# Patient Record
Sex: FEMALE | Race: WHITE | NOT HISPANIC OR LATINO | Employment: OTHER | URBAN - NONMETROPOLITAN AREA
[De-identification: names, ages, dates, MRNs, and addresses within clinical notes are randomized per-mention and may not be internally consistent; named-entity substitution may affect disease eponyms.]

---

## 2020-09-18 DIAGNOSIS — Z12.31 ENCOUNTER FOR SCREENING MAMMOGRAM FOR BREAST CANCER: Primary | ICD-10-CM

## 2020-09-18 DIAGNOSIS — Z13.820 ENCOUNTER FOR SCREENING FOR OSTEOPOROSIS: Primary | ICD-10-CM

## 2020-11-09 ENCOUNTER — HOSPITAL ENCOUNTER (OUTPATIENT)
Dept: RADIOLOGY | Facility: HOSPITAL | Age: 80
Discharge: HOME | End: 2020-11-09
Attending: INTERNAL MEDICINE
Payer: MEDICARE

## 2020-11-09 VITALS — BODY MASS INDEX: 26.5 KG/M2 | WEIGHT: 144 LBS | HEIGHT: 62 IN

## 2020-11-09 DIAGNOSIS — Z12.31 ENCOUNTER FOR SCREENING MAMMOGRAM FOR BREAST CANCER: ICD-10-CM

## 2020-11-09 DIAGNOSIS — Z13.820 ENCOUNTER FOR SCREENING FOR OSTEOPOROSIS: ICD-10-CM

## 2020-11-09 PROCEDURE — 77080 DXA BONE DENSITY AXIAL: CPT | Mod: TC

## 2020-11-09 PROCEDURE — 77067 SCR MAMMO BI INCL CAD: CPT | Mod: TC

## 2021-01-10 ENCOUNTER — IMMUNIZATION (OUTPATIENT)
Dept: OBSTETRICS AND GYNECOLOGY | Facility: CLINIC | Age: 81
End: 2021-01-10
Payer: MEDICARE

## 2021-01-10 DIAGNOSIS — Z23 NEED FOR VACCINATION: ICD-10-CM

## 2021-01-10 PROCEDURE — 91300 COVID-19, MRNA, LNP-S, PF, 30 MCG/0.3 ML DOSE VACCINE: CPT | Mod: PBBFAC

## 2021-01-31 ENCOUNTER — IMMUNIZATION (OUTPATIENT)
Dept: OBSTETRICS AND GYNECOLOGY | Facility: CLINIC | Age: 81
End: 2021-01-31
Payer: MEDICARE

## 2021-01-31 DIAGNOSIS — Z23 NEED FOR VACCINATION: Primary | ICD-10-CM

## 2021-01-31 PROCEDURE — 0002A COVID-19, MRNA, LNP-S, PF, 30 MCG/0.3 ML DOSE VACCINE: CPT | Mod: PBBFAC

## 2021-01-31 PROCEDURE — 91300 COVID-19, MRNA, LNP-S, PF, 30 MCG/0.3 ML DOSE VACCINE: CPT | Mod: PBBFAC

## 2021-12-23 DIAGNOSIS — Z12.31 ENCOUNTER FOR SCREENING MAMMOGRAM FOR MALIGNANT NEOPLASM OF BREAST: Primary | ICD-10-CM

## 2021-12-27 ENCOUNTER — HOSPITAL ENCOUNTER (OUTPATIENT)
Dept: RADIOLOGY | Facility: HOSPITAL | Age: 81
Discharge: HOME | End: 2021-12-27
Attending: INTERNAL MEDICINE
Payer: MEDICARE

## 2021-12-27 VITALS — HEIGHT: 62 IN | WEIGHT: 144 LBS | BODY MASS INDEX: 26.5 KG/M2

## 2021-12-27 DIAGNOSIS — Z12.31 ENCOUNTER FOR SCREENING MAMMOGRAM FOR MALIGNANT NEOPLASM OF BREAST: ICD-10-CM

## 2021-12-27 PROCEDURE — 77067 SCR MAMMO BI INCL CAD: CPT | Mod: TC

## 2022-04-29 ENCOUNTER — LAB VISIT (OUTPATIENT)
Dept: LAB | Facility: HOSPITAL | Age: 82
End: 2022-04-29
Attending: INTERNAL MEDICINE
Payer: MEDICARE

## 2022-04-29 DIAGNOSIS — E03.9 HYPOTHYROIDISM, UNSPECIFIED TYPE: ICD-10-CM

## 2022-04-29 DIAGNOSIS — I10 BENIGN ESSENTIAL HYPERTENSION: ICD-10-CM

## 2022-04-29 DIAGNOSIS — E55.9 VITAMIN D DEFICIENCY: ICD-10-CM

## 2022-04-29 DIAGNOSIS — E78.2 HYPERLIPIDEMIA, MIXED: ICD-10-CM

## 2022-04-29 LAB
BASOPHILS # BLD AUTO: 0.02 K/UL (ref 0–0.2)
BASOPHILS NFR BLD: 0.4 % (ref 0–1.9)
DIFFERENTIAL METHOD: NORMAL
EOSINOPHIL # BLD AUTO: 0.1 K/UL (ref 0–0.5)
EOSINOPHIL NFR BLD: 1.5 % (ref 0–8)
ERYTHROCYTE [DISTWIDTH] IN BLOOD BY AUTOMATED COUNT: 13.2 % (ref 11.5–14.5)
HCT VFR BLD AUTO: 37.4 % (ref 37–48.5)
HGB BLD-MCNC: 12.4 G/DL (ref 12–16)
IMM GRANULOCYTES # BLD AUTO: 0.01 K/UL (ref 0–0.04)
IMM GRANULOCYTES NFR BLD AUTO: 0.2 % (ref 0–0.5)
LYMPHOCYTES # BLD AUTO: 1.6 K/UL (ref 1–4.8)
LYMPHOCYTES NFR BLD: 33.6 % (ref 18–48)
MCH RBC QN AUTO: 29 PG (ref 27–31)
MCHC RBC AUTO-ENTMCNC: 33.2 G/DL (ref 32–36)
MCV RBC AUTO: 88 FL (ref 82–98)
MONOCYTES # BLD AUTO: 0.4 K/UL (ref 0.3–1)
MONOCYTES NFR BLD: 8.2 % (ref 4–15)
NEUTROPHILS # BLD AUTO: 2.7 K/UL (ref 1.8–7.7)
NEUTROPHILS NFR BLD: 56.1 % (ref 38–73)
NRBC BLD-RTO: 0 /100 WBC
PLATELET # BLD AUTO: 198 K/UL (ref 150–450)
PMV BLD AUTO: 12.2 FL (ref 9.2–12.9)
RBC # BLD AUTO: 4.27 M/UL (ref 4–5.4)
TSH SERPL DL<=0.005 MIU/L-ACNC: 1.13 UIU/ML (ref 0.4–4)
WBC # BLD AUTO: 4.76 K/UL (ref 3.9–12.7)

## 2022-04-29 PROCEDURE — 36415 COLL VENOUS BLD VENIPUNCTURE: CPT | Performed by: INTERNAL MEDICINE

## 2022-04-29 PROCEDURE — 82306 VITAMIN D 25 HYDROXY: CPT | Performed by: INTERNAL MEDICINE

## 2022-04-29 PROCEDURE — 85025 COMPLETE CBC W/AUTO DIFF WBC: CPT | Performed by: INTERNAL MEDICINE

## 2022-04-29 PROCEDURE — 84443 ASSAY THYROID STIM HORMONE: CPT | Performed by: INTERNAL MEDICINE

## 2022-04-30 LAB — 25(OH)D3+25(OH)D2 SERPL-MCNC: 65 NG/ML (ref 30–96)

## 2022-05-01 PROBLEM — Z00.00 WELLNESS EXAMINATION: Status: ACTIVE | Noted: 2022-05-01

## 2022-07-20 PROBLEM — G47.33 OBSTRUCTIVE SLEEP APNEA: Status: ACTIVE | Noted: 2022-07-20

## 2022-07-20 PROBLEM — K21.9 GASTROESOPHAGEAL REFLUX: Status: ACTIVE | Noted: 2022-07-20

## 2022-07-20 PROBLEM — D50.9 ANEMIA, IRON DEFICIENCY: Status: ACTIVE | Noted: 2022-07-20

## 2022-07-20 PROBLEM — N60.19 FIBROCYSTIC BREAST DISEASE: Status: ACTIVE | Noted: 2022-07-20

## 2022-07-20 PROBLEM — H81.09: Status: ACTIVE | Noted: 2022-07-20

## 2022-07-25 ENCOUNTER — HOSPITAL ENCOUNTER (OUTPATIENT)
Dept: RADIOLOGY | Facility: HOSPITAL | Age: 82
Discharge: HOME | End: 2022-07-25
Attending: INTERNAL MEDICINE
Payer: MEDICARE

## 2022-07-25 DIAGNOSIS — J40 BRONCHITIS: ICD-10-CM

## 2022-07-25 PROCEDURE — 71046 X-RAY EXAM CHEST 2 VIEWS: CPT | Mod: TC

## 2022-08-01 PROBLEM — Z00.00 WELLNESS EXAMINATION: Status: RESOLVED | Noted: 2022-05-01 | Resolved: 2022-08-01

## 2023-02-01 ENCOUNTER — HOSPITAL ENCOUNTER (OUTPATIENT)
Dept: RADIOLOGY | Facility: HOSPITAL | Age: 83
Discharge: HOME | End: 2023-02-01
Attending: INTERNAL MEDICINE
Payer: MEDICARE

## 2023-02-01 VITALS — WEIGHT: 145 LBS | BODY MASS INDEX: 26.68 KG/M2 | HEIGHT: 62 IN

## 2023-02-01 DIAGNOSIS — Z12.31 ENCOUNTER FOR SCREENING MAMMOGRAM FOR BREAST CANCER: ICD-10-CM

## 2023-02-01 PROCEDURE — 77067 SCR MAMMO BI INCL CAD: CPT | Mod: TC

## 2023-06-12 ENCOUNTER — LAB VISIT (OUTPATIENT)
Dept: LAB | Facility: HOSPITAL | Age: 83
End: 2023-06-12
Attending: INTERNAL MEDICINE
Payer: MEDICARE

## 2023-06-12 DIAGNOSIS — E11.65 INADEQUATELY CONTROLLED DIABETES MELLITUS: ICD-10-CM

## 2023-06-12 DIAGNOSIS — E55.9 VITAMIN D DEFICIENCY: ICD-10-CM

## 2023-06-12 DIAGNOSIS — I10 BENIGN ESSENTIAL HYPERTENSION: ICD-10-CM

## 2023-06-12 DIAGNOSIS — E03.9 HYPOTHYROIDISM, UNSPECIFIED TYPE: ICD-10-CM

## 2023-06-12 LAB
25(OH)D3+25(OH)D2 SERPL-MCNC: 81 NG/ML (ref 30–96)
ALBUMIN/CREAT UR: 23.4 UG/MG (ref 0–30)
BASOPHILS # BLD AUTO: 0.03 K/UL (ref 0–0.2)
BASOPHILS NFR BLD: 0.6 % (ref 0–1.9)
CREAT UR-MCNC: 33.3 MG/DL (ref 15–325)
DIFFERENTIAL METHOD: NORMAL
EOSINOPHIL # BLD AUTO: 0.1 K/UL (ref 0–0.5)
EOSINOPHIL NFR BLD: 1.8 % (ref 0–8)
ERYTHROCYTE [DISTWIDTH] IN BLOOD BY AUTOMATED COUNT: 14 % (ref 11.5–14.5)
HCT VFR BLD AUTO: 39.1 % (ref 37–48.5)
HGB BLD-MCNC: 13.1 G/DL (ref 12–16)
IMM GRANULOCYTES # BLD AUTO: 0.01 K/UL (ref 0–0.04)
IMM GRANULOCYTES NFR BLD AUTO: 0.2 % (ref 0–0.5)
LYMPHOCYTES # BLD AUTO: 1.5 K/UL (ref 1–4.8)
LYMPHOCYTES NFR BLD: 27.1 % (ref 18–48)
MCH RBC QN AUTO: 29.8 PG (ref 27–31)
MCHC RBC AUTO-ENTMCNC: 33.5 G/DL (ref 32–36)
MCV RBC AUTO: 89 FL (ref 82–98)
MICROALBUMIN UR DL<=1MG/L-MCNC: 7.8 MG/L
MONOCYTES # BLD AUTO: 0.5 K/UL (ref 0.3–1)
MONOCYTES NFR BLD: 9.9 % (ref 4–15)
NEUTROPHILS # BLD AUTO: 3.3 K/UL (ref 1.8–7.7)
NEUTROPHILS NFR BLD: 60.4 % (ref 38–73)
NRBC BLD-RTO: 0 /100 WBC
PLATELET # BLD AUTO: 214 K/UL (ref 150–450)
PMV BLD AUTO: 12.4 FL (ref 9.2–12.9)
RBC # BLD AUTO: 4.4 M/UL (ref 4–5.4)
TSH SERPL DL<=0.005 MIU/L-ACNC: 1.42 UIU/ML (ref 0.4–4)
WBC # BLD AUTO: 5.43 K/UL (ref 3.9–12.7)

## 2023-06-12 PROCEDURE — 84443 ASSAY THYROID STIM HORMONE: CPT | Performed by: INTERNAL MEDICINE

## 2023-06-12 PROCEDURE — 85025 COMPLETE CBC W/AUTO DIFF WBC: CPT | Performed by: INTERNAL MEDICINE

## 2023-06-12 PROCEDURE — 36415 COLL VENOUS BLD VENIPUNCTURE: CPT | Performed by: INTERNAL MEDICINE

## 2023-06-12 PROCEDURE — 82570 ASSAY OF URINE CREATININE: CPT | Performed by: INTERNAL MEDICINE

## 2023-06-12 PROCEDURE — 82306 VITAMIN D 25 HYDROXY: CPT | Performed by: INTERNAL MEDICINE

## 2023-06-13 PROBLEM — L30.9 ECZEMA OF LOWER EXTREMITY: Status: ACTIVE | Noted: 2023-06-13

## 2023-06-13 PROBLEM — G25.0 BENIGN ESSENTIAL TREMOR: Status: ACTIVE | Noted: 2023-06-13

## 2023-06-27 ENCOUNTER — HOSPITAL ENCOUNTER (OUTPATIENT)
Facility: HOSPITAL | Age: 83
Discharge: HOME | End: 2023-06-28
Attending: EMERGENCY MEDICINE | Admitting: EMERGENCY MEDICINE
Payer: MEDICARE

## 2023-06-27 DIAGNOSIS — R03.0 ELEVATED BLOOD PRESSURE READING: ICD-10-CM

## 2023-06-27 DIAGNOSIS — G45.9 TIA (TRANSIENT ISCHEMIC ATTACK): Primary | ICD-10-CM

## 2023-06-27 LAB
ALBUMIN SERPL BCP-MCNC: 4.4 G/DL (ref 3.5–5.2)
ALP SERPL-CCNC: 57 U/L (ref 55–135)
ALT SERPL W/O P-5'-P-CCNC: 35 U/L (ref 10–44)
ANION GAP SERPL CALC-SCNC: 9 MMOL/L (ref 8–16)
APTT PPP: 23.9 SEC (ref 21–32)
AST SERPL-CCNC: 20 U/L (ref 10–40)
BASOPHILS # BLD AUTO: 0.03 K/UL (ref 0–0.2)
BASOPHILS NFR BLD: 0.4 % (ref 0–1.9)
BILIRUB SERPL-MCNC: 0.3 MG/DL (ref 0.1–1)
BILIRUB UR QL STRIP: NEGATIVE
BUN SERPL-MCNC: 32 MG/DL (ref 8–23)
CALCIUM SERPL-MCNC: 9.8 MG/DL (ref 8.7–10.5)
CHLORIDE SERPL-SCNC: 100 MMOL/L (ref 95–110)
CLARITY UR: CLEAR
CO2 SERPL-SCNC: 26 MMOL/L (ref 23–29)
COLOR UR: YELLOW
CREAT SERPL-MCNC: 1.2 MG/DL (ref 0.5–1.4)
DIFFERENTIAL METHOD: NORMAL
EOSINOPHIL # BLD AUTO: 0.2 K/UL (ref 0–0.5)
EOSINOPHIL NFR BLD: 2.2 % (ref 0–8)
ERYTHROCYTE [DISTWIDTH] IN BLOOD BY AUTOMATED COUNT: 13.5 % (ref 11.5–14.5)
EST. GFR  (NO RACE VARIABLE): 44.6 ML/MIN/1.73 M^2
ESTIMATED AVG GLUCOSE: 166 MG/DL (ref 68–131)
ESTIMATED AVG GLUCOSE: 166 MG/DL (ref 68–131)
GLUCOSE SERPL-MCNC: 94 MG/DL (ref 70–110)
GLUCOSE UR QL STRIP: NEGATIVE
HBA1C MFR BLD: 7.4 % (ref 4–5.6)
HBA1C MFR BLD: 7.4 % (ref 4–5.6)
HCT VFR BLD AUTO: 38.4 % (ref 37–48.5)
HGB BLD-MCNC: 12.9 G/DL (ref 12–16)
HGB UR QL STRIP: NEGATIVE
IMM GRANULOCYTES # BLD AUTO: 0.03 K/UL (ref 0–0.04)
IMM GRANULOCYTES NFR BLD AUTO: 0.4 % (ref 0–0.5)
INR PPP: 1 (ref 0.8–1.2)
KETONES UR QL STRIP: NEGATIVE
LEUKOCYTE ESTERASE UR QL STRIP: NEGATIVE
LYMPHOCYTES # BLD AUTO: 2.6 K/UL (ref 1–4.8)
LYMPHOCYTES NFR BLD: 38.8 % (ref 18–48)
MCH RBC QN AUTO: 29.2 PG (ref 27–31)
MCHC RBC AUTO-ENTMCNC: 33.6 G/DL (ref 32–36)
MCV RBC AUTO: 87 FL (ref 82–98)
MONOCYTES # BLD AUTO: 0.7 K/UL (ref 0.3–1)
MONOCYTES NFR BLD: 9.7 % (ref 4–15)
NEUTROPHILS # BLD AUTO: 3.3 K/UL (ref 1.8–7.7)
NEUTROPHILS NFR BLD: 48.5 % (ref 38–73)
NITRITE UR QL STRIP: NEGATIVE
NRBC BLD-RTO: 0 /100 WBC
PH UR STRIP: 6 [PH] (ref 5–8)
PLATELET # BLD AUTO: 197 K/UL (ref 150–450)
PMV BLD AUTO: 11.3 FL (ref 9.2–12.9)
POCT GLUCOSE: 175 MG/DL (ref 70–110)
POCT GLUCOSE: 89 MG/DL (ref 70–110)
POTASSIUM SERPL-SCNC: 4 MMOL/L (ref 3.5–5.1)
PROT SERPL-MCNC: 7.5 G/DL (ref 6–8.4)
PROT UR QL STRIP: NEGATIVE
PROTHROMBIN TIME: 10.7 SEC (ref 9–12.5)
RBC # BLD AUTO: 4.42 M/UL (ref 4–5.4)
SODIUM SERPL-SCNC: 135 MMOL/L (ref 136–145)
SP GR UR STRIP: 1.02 (ref 1–1.03)
URN SPEC COLLECT METH UR: NORMAL
UROBILINOGEN UR STRIP-ACNC: NEGATIVE EU/DL
WBC # BLD AUTO: 6.81 K/UL (ref 3.9–12.7)

## 2023-06-27 PROCEDURE — 82962 GLUCOSE BLOOD TEST: CPT

## 2023-06-27 PROCEDURE — 99285 EMERGENCY DEPT VISIT HI MDM: CPT | Mod: 25

## 2023-06-27 PROCEDURE — 36415 COLL VENOUS BLD VENIPUNCTURE: CPT | Performed by: EMERGENCY MEDICINE

## 2023-06-27 PROCEDURE — 25000003 PHARM REV CODE 250: Performed by: EMERGENCY MEDICINE

## 2023-06-27 PROCEDURE — 85730 THROMBOPLASTIN TIME PARTIAL: CPT | Performed by: EMERGENCY MEDICINE

## 2023-06-27 PROCEDURE — 80053 COMPREHEN METABOLIC PANEL: CPT | Performed by: EMERGENCY MEDICINE

## 2023-06-27 PROCEDURE — 25500020 PHARM REV CODE 255: Performed by: EMERGENCY MEDICINE

## 2023-06-27 PROCEDURE — G0425 INPT/ED TELECONSULT30: HCPCS | Mod: 95,G0,, | Performed by: PSYCHIATRY & NEUROLOGY

## 2023-06-27 PROCEDURE — 81003 URINALYSIS AUTO W/O SCOPE: CPT | Performed by: EMERGENCY MEDICINE

## 2023-06-27 PROCEDURE — 83036 HEMOGLOBIN GLYCOSYLATED A1C: CPT | Performed by: EMERGENCY MEDICINE

## 2023-06-27 PROCEDURE — G0425 PR INPT TELEHEALTH CONSULT 30M: ICD-10-PCS | Mod: 95,G0,, | Performed by: PSYCHIATRY & NEUROLOGY

## 2023-06-27 PROCEDURE — G0378 HOSPITAL OBSERVATION PER HR: HCPCS

## 2023-06-27 PROCEDURE — 85610 PROTHROMBIN TIME: CPT | Performed by: EMERGENCY MEDICINE

## 2023-06-27 PROCEDURE — 85025 COMPLETE CBC W/AUTO DIFF WBC: CPT | Performed by: EMERGENCY MEDICINE

## 2023-06-27 RX ORDER — AMLODIPINE BESYLATE 5 MG/1
5 TABLET ORAL DAILY
Status: DISCONTINUED | OUTPATIENT
Start: 2023-06-28 | End: 2023-06-28

## 2023-06-27 RX ORDER — ACETAMINOPHEN 325 MG/1
650 TABLET ORAL EVERY 8 HOURS PRN
Status: DISCONTINUED | OUTPATIENT
Start: 2023-06-27 | End: 2023-06-28 | Stop reason: HOSPADM

## 2023-06-27 RX ORDER — IBUPROFEN 200 MG
24 TABLET ORAL
Status: DISCONTINUED | OUTPATIENT
Start: 2023-06-27 | End: 2023-06-28 | Stop reason: HOSPADM

## 2023-06-27 RX ORDER — ASPIRIN 325 MG
325 TABLET ORAL
Status: COMPLETED | OUTPATIENT
Start: 2023-06-27 | End: 2023-06-27

## 2023-06-27 RX ORDER — LEVOTHYROXINE SODIUM 75 UG/1
75 TABLET ORAL DAILY
Status: DISCONTINUED | OUTPATIENT
Start: 2023-06-28 | End: 2023-06-28 | Stop reason: HOSPADM

## 2023-06-27 RX ORDER — ONDANSETRON 2 MG/ML
4 INJECTION INTRAMUSCULAR; INTRAVENOUS EVERY 8 HOURS PRN
Status: DISCONTINUED | OUTPATIENT
Start: 2023-06-27 | End: 2023-06-28 | Stop reason: HOSPADM

## 2023-06-27 RX ORDER — ATORVASTATIN CALCIUM 20 MG/1
20 TABLET, FILM COATED ORAL DAILY
Status: DISCONTINUED | OUTPATIENT
Start: 2023-06-28 | End: 2023-06-28

## 2023-06-27 RX ORDER — TALC
6 POWDER (GRAM) TOPICAL NIGHTLY PRN
Status: DISCONTINUED | OUTPATIENT
Start: 2023-06-27 | End: 2023-06-28 | Stop reason: HOSPADM

## 2023-06-27 RX ORDER — GLUCAGON 1 MG
1 KIT INJECTION
Status: DISCONTINUED | OUTPATIENT
Start: 2023-06-27 | End: 2023-06-28 | Stop reason: HOSPADM

## 2023-06-27 RX ORDER — INSULIN ASPART 100 [IU]/ML
0-5 INJECTION, SOLUTION INTRAVENOUS; SUBCUTANEOUS
Status: DISCONTINUED | OUTPATIENT
Start: 2023-06-27 | End: 2023-06-28 | Stop reason: HOSPADM

## 2023-06-27 RX ORDER — SODIUM CHLORIDE 0.9 % (FLUSH) 0.9 %
10 SYRINGE (ML) INJECTION
Status: DISCONTINUED | OUTPATIENT
Start: 2023-06-27 | End: 2023-06-28 | Stop reason: HOSPADM

## 2023-06-27 RX ORDER — CLOPIDOGREL BISULFATE 75 MG/1
75 TABLET ORAL DAILY
Status: DISCONTINUED | OUTPATIENT
Start: 2023-06-28 | End: 2023-06-28 | Stop reason: HOSPADM

## 2023-06-27 RX ORDER — METOPROLOL SUCCINATE 100 MG/1
100 TABLET, EXTENDED RELEASE ORAL DAILY
Status: DISCONTINUED | OUTPATIENT
Start: 2023-06-28 | End: 2023-06-28 | Stop reason: HOSPADM

## 2023-06-27 RX ORDER — FAMOTIDINE 20 MG/1
20 TABLET, FILM COATED ORAL DAILY
Status: DISCONTINUED | OUTPATIENT
Start: 2023-06-28 | End: 2023-06-28 | Stop reason: HOSPADM

## 2023-06-27 RX ORDER — ASPIRIN 81 MG/1
81 TABLET ORAL DAILY
Status: DISCONTINUED | OUTPATIENT
Start: 2023-06-28 | End: 2023-06-28 | Stop reason: HOSPADM

## 2023-06-27 RX ORDER — CLOPIDOGREL BISULFATE 75 MG/1
300 TABLET ORAL
Status: COMPLETED | OUTPATIENT
Start: 2023-06-27 | End: 2023-06-27

## 2023-06-27 RX ORDER — IBUPROFEN 200 MG
16 TABLET ORAL
Status: DISCONTINUED | OUTPATIENT
Start: 2023-06-27 | End: 2023-06-28 | Stop reason: HOSPADM

## 2023-06-27 RX ADMIN — CLOPIDOGREL BISULFATE 300 MG: 75 TABLET ORAL at 04:06

## 2023-06-27 RX ADMIN — IOHEXOL 100 ML: 350 INJECTION, SOLUTION INTRAVENOUS at 04:06

## 2023-06-27 RX ADMIN — ASPIRIN 325 MG ORAL TABLET 325 MG: 325 PILL ORAL at 04:06

## 2023-06-27 NOTE — ED NOTES
Discussed admission with the patient, patient admitted to Med-surg  Pt admitted to 623, gave report to nurse Mcfarlane.

## 2023-06-27 NOTE — CONSULTS
[facility]  Vascular Neurology  Comprehensive Stroke Center  TeleVascular Neurology Acute Consultation Note      Consults    Consulting Provider: LAM THOMPSON  Current Providers  No providers found    Patient Location:  [facility] EMERGENCY DEPARTMENT Emergency Department  Spoke hospital nurse at bedside with patient assisting consultant.     Patient information was obtained from patient, relative(s), past medical records, and primary team.         Assessment/Plan:   83 y/o with HTN, HLD, DM, COVID-19, brought in due to acute onset confusion and trouble speaking.  NIHSS 0, CT head negative for acute stroke. Vascular imaging unremarkable.  Concern for acute neurovascular insult but also seizure and mild encephalopathy in the differential diagnosis. No a candidate for TNK at this moment.  Admit, complete medical work up, MRI brain.      Diagnoses: acute confusion.  No new Assessment & Plan notes have been filed under this hospital service since the last note was generated.  Service: Vascular Neurology      STROKE DOCUMENTATION     Acute Stroke Times:   Acute Stroke Times   Last Known Normal Date: 06/27/23  Last Known Normal Time: 1500  Symptom Onset Date: 06/27/23  Symptom Onset Time: 1500  Stroke Team Called Date: 06/27/23  Stroke Team Called Time: 1615  Stroke Team Arrival Date: 06/27/23  Stroke Team Arrival Time: 1620  CT Interpretation Time: 1625  Thrombolytic Therapy Recommended: No  CTA Interpretation Time: 1625  Thrombectomy Recommended: No    NIH Scale:  Interval: baseline  1a. Level of Consciousness: 0-->Alert, keenly responsive  1b. LOC Questions: 0-->Answers both questions correctly  1c. LOC Commands: 0-->Performs both tasks correctly  2. Best Gaze: 0-->Normal  3. Visual: 0-->No visual loss  4. Facial Palsy: 0-->Normal symmetrical movements  5a. Motor Arm, Left: 0-->No drift, limb holds 90 (or 45) degrees for full 10 secs  5b. Motor Arm, Right: 0-->No drift, limb holds  90 (or 45) degrees for full 10 secs  6a. Motor Leg, Left: 0-->No drift, leg holds 30 degree position for full 5 secs  6b. Motor Leg, Right: 0-->No drift, leg holds 30 degree position for full 5 secs  7. Limb Ataxia: 0-->Absent  8. Sensory: 0-->Normal, no sensory loss  9. Best Language: 0-->No aphasia, normal  10. Dysarthria: 0-->Normal  11. Extinction and Inattention (formerly Neglect): 0-->No abnormality  Total (NIH Stroke Scale): 0     Modified Montrose Score: 0  Jazmin Coma Scale:15   ABCD2 Score:    [id number] Score:   HAS -BLED Score:   ICH Score:   Hunt & Cruz Classification:       Blood pressure (!) 173/72, pulse 89, temperature 98.2 °F (36.8 °C), resp. rate 20, weight 68 kg (150 lb), SpO2 100 %.  Eligible for thrombolytic therapy?: No  Thrombolytic therapy recomended: Thrombolytic therapy not recommended due to Patient back to neurological baseline  Possible Interventional Revascularization Candidate? No; No large vessel occlusion identified on imaging     Disposition Recommendation: admit to inpatient    Subjective:     History of Present Illness: 81 y/o with HTN, HLD, DM, COVID-19, brought in due to acute onset confusion and trouble speaking. Never had similar symptoms before.  Patient is aware that earlier today she was having trouble testing and then when she talked to a neighbor was noted to be confused and making no sense.        No notes on file      Woke up with symptoms?: no    Recent bleeding noted: no  Does the patient take any Blood Thinners? no  Medications: Antiplatelets:  no      Past Medical History: hypertension, diabetes, hyperlipidemia and COVID-19 History    Past Surgical History: no major surgeries within the last 2 weeks    Family History: no relevant history    Social History: no smoking, no drinking, no drugs    Allergies: No Known Allergies     Review of Systems   Constitutional: Negative for chills, diaphoresis and fever.   HENT: Negative for hearing loss, tinnitus and trouble  swallowing.    Eyes: Negative for visual disturbance.   Respiratory: Negative for shortness of breath.    Cardiovascular: Negative for chest pain and palpitations.   Gastrointestinal: Negative for blood in stool and vomiting.   Endocrine: Negative for cold intolerance.   Genitourinary: Negative for hematuria.   Musculoskeletal: Negative for gait problem and neck pain.   Skin: Negative for rash.   Allergic/Immunologic: Negative for immunocompromised state.   Neurological: Negative for dizziness, facial asymmetry, speech difficulty, weakness, numbness and headaches.   Hematological: Does not bruise/bleed easily.   Psychiatric/Behavioral: Positive for confusion. Negative for agitation and behavioral problems.     Objective:   Vitals: Blood pressure (!) 202/89, pulse 84, temperature 98.1 °F (36.7 °C), resp. rate 20, weight 68 kg (150 lb), SpO2 96 %.     CT READ: Yes  No hemmorhage. No mass effect. No early infarct signs.    CTA brain and neck: no LVO, no high grade stenosis, no significant carotid disease.    Physical Exam  Vitals and nursing note reviewed.   Constitutional:       General: She is not in acute distress.     Appearance: Normal appearance. She is not diaphoretic.   HENT:      Head: Normocephalic and atraumatic.      Nose: Nose normal.   Eyes:      Extraocular Movements: Extraocular movements intact.   Cardiovascular:      Rate and Rhythm: Normal rate and regular rhythm.   Pulmonary:      Effort: No respiratory distress.   Abdominal:      General: There is no distension.   Genitourinary:     Comments: na  Musculoskeletal:      Cervical back: Normal range of motion.      Right lower leg: No edema.      Left lower leg: No edema.   Skin:     Findings: No erythema or rash.   Neurological:      Mental Status: She is alert and oriented to person, place, and time.      Cranial Nerves: No cranial nerve deficit.      Sensory: No sensory deficit.      Motor: No weakness.      Coordination: Coordination normal.    Psychiatric:         Mood and Affect: Mood normal.         Behavior: Behavior normal.             Recommended the emergency room physician to have a brief discussion with the patient and/or family if available regarding the  risks and benefits of treatment, and to briefly document the occurrence of that discussion in his clinical encounter note.     The attending portion of this evaluation, treatment, and documentation was performed per Chaparro Britt MD via audiovisual.    Billing code:  (non-intervention mild to moderate stroke, TIA, some mimics)      This patient has a critical neurological condition/illness, with some potential for high morbidity and mortality.  There is a moderate probability for acute neurological change leading to clinical and possibly life-threatening deterioration requiring highest level of physician preparedness for urgent intervention.  Care was coordinated with other physicians involved in the patient's care.  Radiologic studies and laboratory data were reviewed and interpreted, and plan of care was re-assessed based on the results.  Diagnosis, treatment options and prognosis may have been discussed with the patient and/or family members or caregiver.    In your opinion, this was a: Tier 1 Van Negative    Consult End Time: 4:34 PM     Chaparro Britt MD  Comprehensive Stroke Center  Vascular Neurology   [facility]

## 2023-06-27 NOTE — SUBJECTIVE & OBJECTIVE
Woke up with symptoms?: no    Recent bleeding noted: no  Does the patient take any Blood Thinners? no  Medications: Antiplatelets:  no      Past Medical History: hypertension, diabetes, hyperlipidemia and COVID-19 History    Past Surgical History: no major surgeries within the last 2 weeks    Family History: no relevant history    Social History: no smoking, no drinking, no drugs    Allergies: No Known Allergies     Review of Systems   Constitutional: Negative for chills, diaphoresis and fever.   HENT: Negative for hearing loss, tinnitus and trouble swallowing.    Eyes: Negative for visual disturbance.   Respiratory: Negative for shortness of breath.    Cardiovascular: Negative for chest pain and palpitations.   Gastrointestinal: Negative for blood in stool and vomiting.   Endocrine: Negative for cold intolerance.   Genitourinary: Negative for hematuria.   Musculoskeletal: Negative for gait problem and neck pain.   Skin: Negative for rash.   Allergic/Immunologic: Negative for immunocompromised state.   Neurological: Negative for dizziness, facial asymmetry, speech difficulty, weakness, numbness and headaches.   Hematological: Does not bruise/bleed easily.   Psychiatric/Behavioral: Positive for confusion. Negative for agitation and behavioral problems.     Objective:   Vitals: Blood pressure (!) 202/89, pulse 84, temperature 98.1 °F (36.7 °C), resp. rate 20, weight 68 kg (150 lb), SpO2 96 %.     CT READ: Yes  No hemmorhage. No mass effect. No early infarct signs.    CTA brain and neck: no LVO, no high grade stenosis, no significant carotid disease.    Physical Exam  Vitals and nursing note reviewed.   Constitutional:       General: She is not in acute distress.     Appearance: Normal appearance. She is not diaphoretic.   HENT:      Head: Normocephalic and atraumatic.      Nose: Nose normal.   Eyes:      Extraocular Movements: Extraocular movements intact.   Cardiovascular:      Rate and Rhythm: Normal rate and  regular rhythm.   Pulmonary:      Effort: No respiratory distress.   Abdominal:      General: There is no distension.   Genitourinary:     Comments: na  Musculoskeletal:      Cervical back: Normal range of motion.      Right lower leg: No edema.      Left lower leg: No edema.   Skin:     Findings: No erythema or rash.   Neurological:      Mental Status: She is alert and oriented to person, place, and time.      Cranial Nerves: No cranial nerve deficit.      Sensory: No sensory deficit.      Motor: No weakness.      Coordination: Coordination normal.   Psychiatric:         Mood and Affect: Mood normal.         Behavior: Behavior normal.

## 2023-06-27 NOTE — ED PROVIDER NOTES
"Encounter Date: 6/27/2023       History     Chief Complaint   Patient presents with    Altered Mental Status     Patient reports an episode of confusion while texting with possible slurred speech about 1 hour prior to arrival, around 3pm. Patient not confused on arrival with no weakness.      82-year-old female with a history of hypertension, hypothyroidism, hyperlipidemia presents to the emergency department after an episode of confusion, was sending gibberish texts to her daughter, has improved tremendously.  Here in the emergency room she is alert oriented x3, GCS is 15.  Neighbor thinks her speech is "heavy" sounding.  No focal deficits, no facial droop, steady gait.  No history of this in the past.  Episode occurred about 1 hour ago per neighbor.  The last known well was roughly 3:00 p.m.    Review of patient's allergies indicates:  No Known Allergies  Past Medical History:   Diagnosis Date    Breast fibrocystic disorder     Dislocation, shoulder, right, sequela     GERD (gastroesophageal reflux disease)     Headache     HTN (hypertension)     Hypothyroidism     Iron deficiency anemia, unspecified     Lumbar pain     Meniere disease     Mixed hyperlipidemia     CISCO (obstructive sleep apnea)     Pruritus     Right shoulder pain     Sciatica     Thyroid nodule      Past Surgical History:   Procedure Laterality Date    APPENDECTOMY      BLADDER SUSPENSION  07/08/2005    Dr Perdomo    BLEPHAROPLASTY, UPPER EYELID  2019    CATARACT EXTRACTION Bilateral 04/2005    COLONOSCOPY  03/11/2008    diverticulosis    CYST RESECTION  06/2010    removed from nose    EYE SURGERY Left 2021    removal of scar tissue    HYSTERECTOMY      OOPHORECTOMY Right     RECTOCELE  07/08/2005    Dr Perdomo    THYROIDECTOMY      TONSILLECTOMY       Family History   Problem Relation Age of Onset    Cancer Mother         bladder    Hypertension Mother     Stroke Mother     Heart attack Mother     Emphysema Father     No Known Problems Sister     " No Known Problems Daughter     No Known Problems Maternal Aunt     No Known Problems Maternal Uncle     Breast cancer Paternal Aunt     No Known Problems Paternal Uncle     No Known Problems Maternal Grandmother     No Known Problems Maternal Grandfather     No Known Problems Paternal Grandmother     No Known Problems Paternal Grandfather     No Known Problems Other     Ovarian cancer Neg Hx     BRCA 1/2 Neg Hx      Social History     Tobacco Use    Smoking status: Never    Smokeless tobacco: Never   Substance Use Topics    Alcohol use: Not Currently     Review of Systems   Constitutional:  Negative for fever.   HENT:  Negative for sore throat.    Respiratory:  Negative for shortness of breath.    Cardiovascular:  Negative for chest pain.   Gastrointestinal:  Negative for nausea.   Genitourinary:  Negative for dysuria.   Musculoskeletal:  Negative for back pain.   Skin:  Negative for rash.   Neurological:  Negative for weakness.   Hematological:  Does not bruise/bleed easily.   All other systems reviewed and are negative.    Physical Exam     Initial Vitals   BP Pulse Resp Temp SpO2   06/27/23 1604 06/27/23 1604 06/27/23 1604 06/27/23 1627 06/27/23 1604   (!) 202/89 84 20 98.1 °F (36.7 °C) 96 %      MAP       --                Physical Exam    Nursing note and vitals reviewed.  Constitutional: She appears well-developed and well-nourished. She is not diaphoretic. No distress.   HENT:   Head: Normocephalic and atraumatic.   Eyes: Conjunctivae and EOM are normal. Pupils are equal, round, and reactive to light.   Neck: Neck supple.   Normal range of motion.  Cardiovascular:  Normal rate, regular rhythm, normal heart sounds and intact distal pulses.           No murmur heard.  Pulmonary/Chest: Breath sounds normal. No respiratory distress. She has no wheezes. She has no rhonchi. She has no rales. She exhibits no tenderness.   Abdominal: Abdomen is soft. Bowel sounds are normal.   Musculoskeletal:         General: No  tenderness or edema. Normal range of motion.      Cervical back: Normal range of motion and neck supple.     Neurological: She is alert and oriented to person, place, and time. She has normal strength. No cranial nerve deficit. GCS score is 15. GCS eye subscore is 4. GCS verbal subscore is 5. GCS motor subscore is 6.   Skin: Skin is warm and dry. Capillary refill takes less than 2 seconds.       ED Course   Critical Care    Date/Time: 6/27/2023 4:38 PM  Performed by: Ricardo Shaffer MD  Authorized by: Ricardo Shaffer MD   Direct patient critical care time: 15 minutes  Additional history critical care time: 10 minutes  Ordering / reviewing critical care time: 10 minutes  Documentation critical care time: 10 minutes  Consulting other physicians critical care time: 10 minutes  Consult with family critical care time: 10 minutes  Total critical care time (exclusive of procedural time) : 65 minutes      Labs Reviewed   COMPREHENSIVE METABOLIC PANEL - Abnormal; Notable for the following components:       Result Value    Sodium 135 (*)     BUN 32 (*)     eGFR 44.6 (*)     All other components within normal limits   CBC W/ AUTO DIFFERENTIAL   APTT   PROTIME-INR   URINALYSIS, REFLEX TO URINE CULTURE   POCT GLUCOSE          Imaging Results              CTA Brain (In process)  Result time 06/27/23 16:39:43                     CTA Neck (Final result)  Result time 06/27/23 16:39:18      Final result by Brandon Mata MD (06/27/23 16:39:18)                   Impression:      1. No evidence of right or left ICA stenosis.  2. Approximately 50% stenosis distal right vertebral artery.      Electronically signed by: Brandon Mata MD  Date:    06/27/2023  Time:    16:39               Narrative:    EXAMINATION:  CTA NECK    CLINICAL HISTORY:  Stroke, follow up;    TECHNIQUE:  Thin section post IV contrast images were obtained of the neck.  Multiplanar MIP images were performed.  Images were viewed at 3D workstation also.  Iterative  reconstruction technique was used.  NASCET criteria used for stenosis calculation.    CT/cardiac nuclear exam/s in prior 12 months:  0.    COMPARISON:  None.    FINDINGS:  Aortic arch: Mild plaque.  Origins of great vessels patent..    Right Carotid: Mild calcified plaque proximal ICA.  No significant ICA stenosis.  Patent CCA and ECA.    Left Carotid: Mild soft plaque carotid bulb.  No significant ICA stenosis.  Patent CCA and ECA.    Vertebrals: Plaque distal right vertebral with approximate 50% stenosis.  Patent left vertebral artery..    Source CT images demonstrate no evidence of neck mass or lymph node enlargement.  Moderate degenerative changes in the cervical spine.                                       CT Head Without Contrast (Final result)  Result time 06/27/23 16:33:10      Final result by Brandon Mata MD (06/27/23 16:33:10)                   Impression:      No acute intracranial findings.      Electronically signed by: Brandon Mata MD  Date:    06/27/2023  Time:    16:33               Narrative:    EXAMINATION:  CT HEAD WITHOUT CONTRAST    CLINICAL HISTORY:  Mental status change, unknown cause;    TECHNIQUE:  Iterative reconstruction technique was performed.    CT/cardiac nuclear exam/s in prior 12 months:  0.    COMPARISON:  Head CT 10/14/2016.    FINDINGS:  No hydrocephalus. No mass or mass effect. No signs of acute hemorrhage. Cranial bones unremarkable.  Moderate vascular calcifications.                                       Medications   iohexoL (OMNIPAQUE 350) injection 100 mL (100 mLs Intravenous Given 6/27/23 1624)   aspirin tablet 325 mg (325 mg Oral Given 6/27/23 1633)   clopidogreL tablet 300 mg (300 mg Oral Given 6/27/23 1633)     Medical Decision Making:   Differential Diagnosis:   TIA, confusion  ED Management:  Tele stroke completed, Dr. Decker does not recommend acute thrombolytics due to improvement of symptoms.  Does recommend loading with aspirin and Plavix, admitting here, MRI  in the morning, neuro checks, etc.                        Clinical Impression:   Final diagnoses:  [G45.9] TIA (transient ischemic attack) (Primary)  [R03.0] Elevated blood pressure reading        ED Disposition Condition    Observation Stable                Ricardo Shaffer MD  06/27/23 5325

## 2023-06-28 VITALS
HEIGHT: 62 IN | RESPIRATION RATE: 20 BRPM | HEART RATE: 86 BPM | TEMPERATURE: 98 F | OXYGEN SATURATION: 96 % | BODY MASS INDEX: 27.6 KG/M2 | DIASTOLIC BLOOD PRESSURE: 81 MMHG | SYSTOLIC BLOOD PRESSURE: 147 MMHG | WEIGHT: 150 LBS

## 2023-06-28 PROBLEM — G45.9 TIA (TRANSIENT ISCHEMIC ATTACK): Status: ACTIVE | Noted: 2023-06-28

## 2023-06-28 PROBLEM — Z79.899 ON DEEP VEIN THROMBOSIS (DVT) PROPHYLAXIS: Status: ACTIVE | Noted: 2023-06-28

## 2023-06-28 PROBLEM — E11.59 TYPE 2 DIABETES MELLITUS WITH CIRCULATORY DISORDER, WITHOUT LONG-TERM CURRENT USE OF INSULIN: Status: ACTIVE | Noted: 2023-06-12

## 2023-06-28 LAB
ALBUMIN SERPL BCP-MCNC: 3.7 G/DL (ref 3.5–5.2)
ALP SERPL-CCNC: 47 U/L (ref 55–135)
ALT SERPL W/O P-5'-P-CCNC: 28 U/L (ref 10–44)
ANION GAP SERPL CALC-SCNC: 8 MMOL/L (ref 8–16)
AST SERPL-CCNC: 15 U/L (ref 10–40)
BASOPHILS # BLD AUTO: 0.03 K/UL (ref 0–0.2)
BASOPHILS NFR BLD: 0.6 % (ref 0–1.9)
BILIRUB SERPL-MCNC: 0.3 MG/DL (ref 0.1–1)
BUN SERPL-MCNC: 29 MG/DL (ref 8–23)
CALCIUM SERPL-MCNC: 9.3 MG/DL (ref 8.7–10.5)
CHLORIDE SERPL-SCNC: 105 MMOL/L (ref 95–110)
CO2 SERPL-SCNC: 26 MMOL/L (ref 23–29)
CREAT SERPL-MCNC: 0.9 MG/DL (ref 0.5–1.4)
DIFFERENTIAL METHOD: ABNORMAL
EOSINOPHIL # BLD AUTO: 0.1 K/UL (ref 0–0.5)
EOSINOPHIL NFR BLD: 2.9 % (ref 0–8)
ERYTHROCYTE [DISTWIDTH] IN BLOOD BY AUTOMATED COUNT: 13.4 % (ref 11.5–14.5)
EST. GFR  (NO RACE VARIABLE): >60 ML/MIN/1.73 M^2
GLUCOSE SERPL-MCNC: 104 MG/DL (ref 70–110)
HCT VFR BLD AUTO: 34.3 % (ref 37–48.5)
HGB BLD-MCNC: 11.4 G/DL (ref 12–16)
IMM GRANULOCYTES # BLD AUTO: 0.02 K/UL (ref 0–0.04)
IMM GRANULOCYTES NFR BLD AUTO: 0.4 % (ref 0–0.5)
LYMPHOCYTES # BLD AUTO: 1.6 K/UL (ref 1–4.8)
LYMPHOCYTES NFR BLD: 34 % (ref 18–48)
MCH RBC QN AUTO: 29 PG (ref 27–31)
MCHC RBC AUTO-ENTMCNC: 33.2 G/DL (ref 32–36)
MCV RBC AUTO: 87 FL (ref 82–98)
MONOCYTES # BLD AUTO: 0.5 K/UL (ref 0.3–1)
MONOCYTES NFR BLD: 11.2 % (ref 4–15)
NEUTROPHILS # BLD AUTO: 2.5 K/UL (ref 1.8–7.7)
NEUTROPHILS NFR BLD: 50.9 % (ref 38–73)
NRBC BLD-RTO: 0 /100 WBC
PLATELET # BLD AUTO: 181 K/UL (ref 150–450)
PMV BLD AUTO: 11.8 FL (ref 9.2–12.9)
POCT GLUCOSE: 113 MG/DL (ref 70–110)
POCT GLUCOSE: 124 MG/DL (ref 70–110)
POCT GLUCOSE: 134 MG/DL (ref 70–110)
POTASSIUM SERPL-SCNC: 3.8 MMOL/L (ref 3.5–5.1)
PROT SERPL-MCNC: 6.4 G/DL (ref 6–8.4)
RBC # BLD AUTO: 3.93 M/UL (ref 4–5.4)
SODIUM SERPL-SCNC: 139 MMOL/L (ref 136–145)
WBC # BLD AUTO: 4.83 K/UL (ref 3.9–12.7)

## 2023-06-28 PROCEDURE — A9585 GADOBUTROL INJECTION: HCPCS | Performed by: INTERNAL MEDICINE

## 2023-06-28 PROCEDURE — G0378 HOSPITAL OBSERVATION PER HR: HCPCS

## 2023-06-28 PROCEDURE — 25000003 PHARM REV CODE 250: Performed by: EMERGENCY MEDICINE

## 2023-06-28 PROCEDURE — 97165 OT EVAL LOW COMPLEX 30 MIN: CPT

## 2023-06-28 PROCEDURE — 85025 COMPLETE CBC W/AUTO DIFF WBC: CPT | Performed by: EMERGENCY MEDICINE

## 2023-06-28 PROCEDURE — 25500020 PHARM REV CODE 255: Performed by: INTERNAL MEDICINE

## 2023-06-28 PROCEDURE — 97161 PT EVAL LOW COMPLEX 20 MIN: CPT

## 2023-06-28 PROCEDURE — 25000003 PHARM REV CODE 250: Performed by: INTERNAL MEDICINE

## 2023-06-28 PROCEDURE — 80053 COMPREHEN METABOLIC PANEL: CPT | Performed by: EMERGENCY MEDICINE

## 2023-06-28 PROCEDURE — 36415 COLL VENOUS BLD VENIPUNCTURE: CPT | Performed by: EMERGENCY MEDICINE

## 2023-06-28 PROCEDURE — 25000003 PHARM REV CODE 250

## 2023-06-28 RX ORDER — LISINOPRIL 10 MG/1
10 TABLET ORAL DAILY
Status: DISCONTINUED | OUTPATIENT
Start: 2023-06-28 | End: 2023-06-28 | Stop reason: HOSPADM

## 2023-06-28 RX ORDER — ATORVASTATIN CALCIUM 40 MG/1
40 TABLET, FILM COATED ORAL DAILY
Qty: 90 TABLET | Refills: 3 | Status: SHIPPED | OUTPATIENT
Start: 2023-06-29 | End: 2024-06-28

## 2023-06-28 RX ORDER — GADOBUTROL 604.72 MG/ML
7 INJECTION INTRAVENOUS
Status: DISCONTINUED | OUTPATIENT
Start: 2023-06-28 | End: 2023-06-28 | Stop reason: HOSPADM

## 2023-06-28 RX ORDER — CLOPIDOGREL BISULFATE 75 MG/1
75 TABLET ORAL DAILY
Qty: 30 TABLET | Refills: 11 | Status: SHIPPED | OUTPATIENT
Start: 2023-06-29 | End: 2024-06-28

## 2023-06-28 RX ORDER — DIAZEPAM 2 MG/1
2 TABLET ORAL DAILY
Status: DISCONTINUED | OUTPATIENT
Start: 2023-06-28 | End: 2023-06-28 | Stop reason: HOSPADM

## 2023-06-28 RX ORDER — AMLODIPINE BESYLATE 2.5 MG/1
2.5 TABLET ORAL DAILY
Status: DISCONTINUED | OUTPATIENT
Start: 2023-06-28 | End: 2023-06-28 | Stop reason: HOSPADM

## 2023-06-28 RX ORDER — DIAZEPAM 5 MG/1
5 TABLET ORAL EVERY 8 HOURS PRN
Status: DISCONTINUED | OUTPATIENT
Start: 2023-06-28 | End: 2023-06-28 | Stop reason: HOSPADM

## 2023-06-28 RX ORDER — AMLODIPINE BESYLATE 2.5 MG/1
2.5 TABLET ORAL DAILY
Status: DISCONTINUED | OUTPATIENT
Start: 2023-06-29 | End: 2023-06-28

## 2023-06-28 RX ORDER — ATORVASTATIN CALCIUM 40 MG/1
40 TABLET, FILM COATED ORAL DAILY
Status: DISCONTINUED | OUTPATIENT
Start: 2023-06-28 | End: 2023-06-28 | Stop reason: HOSPADM

## 2023-06-28 RX ORDER — ACETAMINOPHEN 325 MG/1
650 TABLET ORAL EVERY 8 HOURS PRN
Refills: 0
Start: 2023-06-28

## 2023-06-28 RX ADMIN — DIAZEPAM 2 MG: 2 TABLET ORAL at 10:06

## 2023-06-28 RX ADMIN — CLOPIDOGREL BISULFATE 75 MG: 75 TABLET ORAL at 09:06

## 2023-06-28 RX ADMIN — FAMOTIDINE 20 MG: 20 TABLET, FILM COATED ORAL at 09:06

## 2023-06-28 RX ADMIN — ASPIRIN 81 MG: 81 TABLET, COATED ORAL at 09:06

## 2023-06-28 RX ADMIN — AMLODIPINE BESYLATE 2.5 MG: 2.5 TABLET ORAL at 09:06

## 2023-06-28 RX ADMIN — ATORVASTATIN CALCIUM 40 MG: 20 TABLET, FILM COATED ORAL at 09:06

## 2023-06-28 RX ADMIN — LISINOPRIL 10 MG: 10 TABLET ORAL at 09:06

## 2023-06-28 RX ADMIN — LEVOTHYROXINE SODIUM 75 MCG: 75 TABLET ORAL at 09:06

## 2023-06-28 RX ADMIN — METOPROLOL SUCCINATE 100 MG: 100 TABLET, EXTENDED RELEASE ORAL at 09:06

## 2023-06-28 NOTE — PT/OT/SLP PROGRESS
Physical Therapy      Patient Name:  Stephanie Porras   MRN:  [id number]    Patient not seen today secondary to Testing/imaging (xray/CT/MRI) MRI. Will follow-up .

## 2023-06-28 NOTE — H&P
"[facility] Medicine  History & Physical    Patient Name: Stephanie Porras  MRN: [id number]  Patient Class: OP- Observation  Admission Date: 6/27/2023  Attending Physician: Yael Santana MD   Primary Care Provider: Yael Santana MD         Patient information was obtained from patient, relative(s), past medical records, ER records and primary team.     Subjective:     Principal Problem:TIA (transient ischemic attack)    Chief Complaint:   Chief Complaint   Patient presents with    Altered Mental Status     Patient reports an episode of confusion while texting with possible slurred speech about 1 hour prior to arrival, around 3pm. Patient not confused on arrival with no weakness.         HPI: ED HPI: 82-year-old female with a history of hypertension, hypothyroidism, hyperlipidemia presents to the emergency department after an episode of confusion, was sending gibberish texts to her daughter, has improved tremendously.  Here in the emergency room she is alert oriented x3, GCS is 15.  Neighbor thinks her speech is "heavy" sounding.  No focal deficits, no facial droop, steady gait.  No history of this in the past.  Episode occurred about 1 hour ago per neighbor.  The last known well was roughly 3:00 p.m.    IM HPI:  Agree with the above HPI.  Patient has a history of diabetes, hypertension, hypothyroidism, anemia, Meniere disease, hyperlipidemia, GERD.  Patient states that she had an episode of confusion and aphasia then was brought to the emergency department.  Patient reports that her blood glucose yesterday was about 120.  Patient is awake alert and oriented this morning.  Neurological exam is within normal limits, GCS is 15.  Patient's TIA symptoms have completely resolved.  PT and OT to assess for any residual weakness.  Patient will have an MRI brain today.  CT of head yesterday resulted negative for stroke.  Approximately 50% stenosis noted to distal left ICA and distal right vertebral " artery.  We will increase statin dosage and added Plavix to regimen.  Patient will stay overnight with possible discharge tomorrow.      Past Medical History:   Diagnosis Date    Breast fibrocystic disorder     Dislocation, shoulder, right, sequela     GERD (gastroesophageal reflux disease)     Headache     HTN (hypertension)     Hypothyroidism     Iron deficiency anemia, unspecified     Lumbar pain     Meniere disease     Mixed hyperlipidemia     CISCO (obstructive sleep apnea)     Pruritus     Right shoulder pain     Sciatica     Thyroid nodule        Past Surgical History:   Procedure Laterality Date    APPENDECTOMY      BLADDER SUSPENSION  07/08/2005    Dr Perdomo    BLEPHAROPLASTY, UPPER EYELID  2019    CATARACT EXTRACTION Bilateral 04/2005    COLONOSCOPY  03/11/2008    diverticulosis    CYST RESECTION  06/2010    removed from nose    EYE SURGERY Left 2021    removal of scar tissue    HYSTERECTOMY      OOPHORECTOMY Right     RECTOCELE  07/08/2005    Dr Perdomo    THYROIDECTOMY      TONSILLECTOMY         Review of patient's allergies indicates:  No Known Allergies    No current facility-administered medications on file prior to encounter.     Current Outpatient Medications on File Prior to Encounter   Medication Sig    amlodipine-benazepril 2.5-10 mg (LOTREL) 2.5-10 mg per capsule TAKE 1 CAPSULE BY MOUTH EVERY DAY    ascorbic acid, vitamin C, (VITAMIN C) 500 MG tablet Vitamin C 500 mg tablet, [RxNorm: 961486]    aspirin (ECOTRIN) 81 MG EC tablet Take 81 mg by mouth once daily.    atorvastatin (LIPITOR) 20 MG tablet TAKE 1 TABLET ORALLY ONCE A DAY AT NIGHT.    blood sugar diagnostic Strp One Touch Ultra2 test strips-Use to check blood sugar daily Dx: E11.9    blood-glucose meter (ONETOUCH ULTRA2 METER) Misc CHECK BLOOD SUGAR ONCE DAILY    cholecalciferol, vitamin D3, 25 mcg (1,000 unit) Chew 1 tablet.    diazePAM (VALIUM) 5 MG tablet 1/2 pill po qday for tremors and then 1 pill po  tid prn dizziness    lancets 32 gauge Misc One Touch Ultra lancets- Use to check blood sugar daily    levothyroxine (SYNTHROID) 75 MCG tablet TAKE 1 TABLET BY MOUTH DAILY    meclizine (ANTIVERT) 50 MG tablet Take 25 mg by mouth 3 (three) times daily as needed.    metFORMIN (GLUCOPHAGE) 500 MG tablet Take 1 tablet (500 mg total) by mouth 2 (two) times daily with meals.    metoprolol succinate (TOPROL-XL) 100 MG 24 hr tablet TAKE 1 TABLET BY MOUTH EVERY DAY    omeprazole (PRILOSEC) 20 MG capsule TAKE 1 CAPSULE BY MOUTH EVERY DAY    ondansetron (ZOFRAN) 8 MG tablet Take by mouth every 8 (eight) hours as needed for Nausea.    peg 400-propylene glycol 0.4-0.3 % DrpG Apply to eye.    triamcinolone acetonide 0.1% (KENALOG) 0.1 % cream Apply topically 3 (three) times daily as needed (rash/itching).    VASCEPA 1 gram Cap TAKE 2 CAPSULES (2,000 MG TOTAL) BY MOUTH 2 (TWO) TIMES DAILY.    white petrolatum-mineral oil 57.3-42.5% (REFRESH P.M.) 57.3-42.5 % Oint apply a small amount to affected eye  once a day (at bedtime)     Family History       Problem Relation (Age of Onset)    Breast cancer Paternal Aunt    Cancer Mother    Emphysema Father    Heart attack Mother    Hypertension Mother    No Known Problems Sister, Daughter, Maternal Aunt, Maternal Uncle, Paternal Uncle, Maternal Grandmother, Maternal Grandfather, Paternal Grandmother, Paternal Grandfather, Other    Stroke Mother          Tobacco Use    Smoking status: Never    Smokeless tobacco: Never   Substance and Sexual Activity    Alcohol use: Not Currently    Drug use: Not on file    Sexual activity: Never     Review of Systems   Constitutional:  Negative for activity change, appetite change, chills and fever.   HENT:  Negative for ear pain, mouth sores, nosebleeds, sore throat and trouble swallowing.    Eyes:  Negative for visual disturbance.   Respiratory:  Negative for cough, shortness of breath and wheezing.    Cardiovascular:  Negative for chest  pain, palpitations and leg swelling.   Gastrointestinal:  Negative for abdominal distention, abdominal pain, blood in stool, constipation, diarrhea, nausea and vomiting.   Endocrine: Negative for polyphagia.   Genitourinary:  Negative for difficulty urinating, dysuria, flank pain and frequency.   Musculoskeletal:  Positive for arthralgias and gait problem. Negative for back pain and myalgias.   Skin:  Negative for rash.   Allergic/Immunologic: Negative.    Neurological:  Positive for weakness. Negative for dizziness, tremors, seizures, syncope, facial asymmetry, speech difficulty, light-headedness, numbness and headaches.   Hematological:  Negative for adenopathy.   Psychiatric/Behavioral:  Negative for agitation, confusion and hallucinations. The patient is not nervous/anxious.    Objective:     Vital Signs (Most Recent):  Temp: 97.4 °F (36.3 °C) (06/28/23 0732)  Pulse: 92 (06/28/23 0801)  Resp: 19 (06/28/23 0732)  BP: (!) 172/84 (06/28/23 0732)  SpO2: (!) 93 % (06/28/23 0732) Vital Signs (24h Range):  Temp:  [97.4 °F (36.3 °C)-98.2 °F (36.8 °C)] 97.4 °F (36.3 °C)  Pulse:  [67-92] 92  Resp:  [14-20] 19  SpO2:  [91 %-100 %] 93 %  BP: (131-202)/(63-89) 172/84     Weight: 68 kg (150 lb)  Body mass index is 27.44 kg/m².     Physical Exam  Vitals and nursing note reviewed.   Constitutional:       General: She is not in acute distress.     Appearance: Normal appearance. She is not ill-appearing or toxic-appearing.   HENT:      Head: Normocephalic and atraumatic.      Right Ear: External ear normal.      Left Ear: External ear normal.      Nose: Nose normal.      Mouth/Throat:      Mouth: Mucous membranes are moist.   Eyes:      General: No scleral icterus.     Extraocular Movements: Extraocular movements intact.      Conjunctiva/sclera: Conjunctivae normal.      Pupils: Pupils are equal, round, and reactive to light.   Neck:      Vascular: No carotid bruit.   Cardiovascular:      Rate and Rhythm: Normal rate and regular  rhythm.      Pulses: Normal pulses.      Heart sounds: Normal heart sounds. No murmur heard.    No friction rub. No gallop.   Pulmonary:      Effort: Pulmonary effort is normal. No respiratory distress.      Breath sounds: Normal breath sounds. No wheezing, rhonchi or rales.   Abdominal:      General: Bowel sounds are normal. There is no distension.      Palpations: Abdomen is soft.      Tenderness: There is no abdominal tenderness. There is no guarding or rebound.   Musculoskeletal:         General: No swelling. Normal range of motion.      Cervical back: Normal range of motion and neck supple. No rigidity.      Right lower leg: No edema.      Left lower leg: No edema.   Skin:     General: Skin is warm and dry.      Capillary Refill: Capillary refill takes less than 2 seconds.      Coloration: Skin is not jaundiced.      Findings: No rash.   Neurological:      General: No focal deficit present.      Mental Status: She is alert and oriented to person, place, and time. Mental status is at baseline.      GCS: GCS eye subscore is 4. GCS verbal subscore is 5. GCS motor subscore is 6.      Cranial Nerves: Cranial nerves 2-12 are intact. No cranial nerve deficit or facial asymmetry.      Sensory: Sensation is intact.      Motor: Weakness present.      Coordination: Coordination is intact.      Gait: Gait is intact. Gait normal.   Psychiatric:         Mood and Affect: Mood normal.         Behavior: Behavior normal.         Thought Content: Thought content normal.         Judgment: Judgment normal.            CRANIAL NERVES     CN III, IV, VI   Pupils are equal, round, and reactive to light.     Significant Labs: All pertinent labs within the past 24 hours have been reviewed.    Significant Imaging: I have reviewed all pertinent imaging results/findings within the past 24 hours.    Assessment/Plan:     * TIA (transient ischemic attack)  Antithrombotics for secondary stroke prevention: Antiplatelets: Aspirin: 81 mg  daily  Clopidogrel: 75 mg daily    Statins for secondary stroke prevention and hyperlipidemia, if present:   Statins: Atorvastatin- 40 mg daily    Aggressive risk factor modification: HTN, DM, HLD, Obesity     Rehab efforts: The patient has been evaluated by a stroke team provider and the therapy needs have been fully considered based off the presenting complaints and exam findings. The following therapy evaluations are needed: PT evaluate and treat, OT evaluate and treat    Diagnostics ordered/pending: CT scan of head without contrast to asses brain parenchyma, CTA Head to assess vasculature , CTA Neck/Arch to assess vasculature, HgbA1C to assess blood glucose levels, MRI head without contrast to assess brain parenchyma    VTE prophylaxis: Mechanical prophylaxis: Place SCDs    06/28 CP: CT of head performed yesterday resulted no acute intracranial findings.  CTA of brain and neck resulted 50% stenosis and distal left ICA and distal right vertebral artery.  MRI of brain with and without contrast to be performed today.  Plavix added to regimen, continue daily aspirin.  Atorvastatin dose increased.  TIA symptoms of aphasia and confusion have resolved since admission.  PT and OT ordered.        On deep vein thrombosis (DVT) prophylaxis  SCDs and TEDs ordered. plavix and aspirin. PT and OT.      Type 2 diabetes mellitus with circulatory disorder, without long-term current use of insulin  Patient's FSGs are controlled on current medication regimen.  Last A1c reviewed-   Lab Results   Component Value Date    HGBA1C 7.4 (H) 06/27/2023    HGBA1C 7.4 (H) 06/27/2023     Most recent fingerstick glucose reviewed-   Recent Labs   Lab 06/27/23  1602 06/27/23  2038 06/28/23  0629   POCTGLUCOSE 89 175* 113*     Current correctional scale  Low  Maintain anti-hyperglycemic dose as follows-   Antihyperglycemics (From admission, onward)    Start     Stop Route Frequency Ordered    06/27/23 0773  insulin aspart U-100 pen 0-5 Units          -- SubQ Before meals & nightly PRN 06/27/23 1659        Hold Oral hypoglycemics while patient is in the hospital.    Gastroesophageal reflux  Pepcid ordered.      Anemia, iron deficiency  06/28 CP:  Blood counts stable.  We will continue to monitor.    Hemoglobin   Date Value Ref Range Status   06/28/2023 11.4 (L) 12.0 - 16.0 g/dL Final      Hematocrit   Date Value Ref Range Status   06/28/2023 34.3 (L) 37.0 - 48.5 % Final        Hypothyroidism  Levothyroxine ordered.      Hyperlipidemia, mixed  Statin dose increased secondary to TIA and stenosis of left ICA and right vertebral artery.      Benign essential hypertension  06/28 CP:  Patient was hypotensive upon admission to the emergency department.  Some hypertension noted this morning.  Restarted home blood pressure medications.  We will continue to monitor.        VTE Risk Mitigation (From admission, onward)         Ordered     IP VTE HIGH RISK PATIENT  Once         06/27/23 1659     Place sequential compression device  Until discontinued         06/27/23 1659     Place BRADLEY hose  Until discontinued         06/27/23 1659                     On 06/28/2023, patient should be placed in hospital observation services under my care in collaboration with Dr. Santana.      SHAWN RAM  Department of Hospital Medicine  [facility]

## 2023-06-28 NOTE — PT/OT/SLP DISCHARGE
Physical Therapy Discharge Summary    Name: Stephanie Porras  MRN: [id number]   Principal Problem: TIA (transient ischemic attack)     Patient Discharged from acute Physical Therapy on 6/28/23.  Please refer to prior PT noted date on 6/28/23 for functional status.     Assessment:     Patient is independent with mobility and functional activities, and therapy will not be required at this time    Objective:     GOALS:   Multidisciplinary Problems       Physical Therapy Goals       Not on file                    Reasons for Discontinuation of Therapy Services  Patient is independent with all ADLs and functional mobility.        Plan:     Patient Discharged to:  Patient will be DC from PT .      6/28/2023

## 2023-06-28 NOTE — SUBJECTIVE & OBJECTIVE
Past Medical History:   Diagnosis Date    Breast fibrocystic disorder     Dislocation, shoulder, right, sequela     GERD (gastroesophageal reflux disease)     Headache     HTN (hypertension)     Hypothyroidism     Iron deficiency anemia, unspecified     Lumbar pain     Meniere disease     Mixed hyperlipidemia     CISCO (obstructive sleep apnea)     Pruritus     Right shoulder pain     Sciatica     Thyroid nodule        Past Surgical History:   Procedure Laterality Date    APPENDECTOMY      BLADDER SUSPENSION  07/08/2005    Dr Perdomo    BLEPHAROPLASTY, UPPER EYELID  2019    CATARACT EXTRACTION Bilateral 04/2005    COLONOSCOPY  03/11/2008    diverticulosis    CYST RESECTION  06/2010    removed from nose    EYE SURGERY Left 2021    removal of scar tissue    HYSTERECTOMY      OOPHORECTOMY Right     RECTOCELE  07/08/2005    Dr Perdomo    THYROIDECTOMY      TONSILLECTOMY         Review of patient's allergies indicates:  No Known Allergies    No current facility-administered medications on file prior to encounter.     Current Outpatient Medications on File Prior to Encounter   Medication Sig    amlodipine-benazepril 2.5-10 mg (LOTREL) 2.5-10 mg per capsule TAKE 1 CAPSULE BY MOUTH EVERY DAY    ascorbic acid, vitamin C, (VITAMIN C) 500 MG tablet Vitamin C 500 mg tablet, [RxNorm: 836056]    aspirin (ECOTRIN) 81 MG EC tablet Take 81 mg by mouth once daily.    atorvastatin (LIPITOR) 20 MG tablet TAKE 1 TABLET ORALLY ONCE A DAY AT NIGHT.    blood sugar diagnostic Strp One Touch Ultra2 test strips-Use to check blood sugar daily Dx: E11.9    blood-glucose meter (ONETOUCH ULTRA2 METER) Misc CHECK BLOOD SUGAR ONCE DAILY    cholecalciferol, vitamin D3, 25 mcg (1,000 unit) Chew 1 tablet.    diazePAM (VALIUM) 5 MG tablet 1/2 pill po qday for tremors and then 1 pill po tid prn dizziness    lancets 32 gauge Misc One Touch Ultra lancets- Use to check blood sugar daily    levothyroxine (SYNTHROID) 75 MCG tablet TAKE 1 TABLET BY MOUTH DAILY     meclizine (ANTIVERT) 50 MG tablet Take 25 mg by mouth 3 (three) times daily as needed.    metFORMIN (GLUCOPHAGE) 500 MG tablet Take 1 tablet (500 mg total) by mouth 2 (two) times daily with meals.    metoprolol succinate (TOPROL-XL) 100 MG 24 hr tablet TAKE 1 TABLET BY MOUTH EVERY DAY    omeprazole (PRILOSEC) 20 MG capsule TAKE 1 CAPSULE BY MOUTH EVERY DAY    ondansetron (ZOFRAN) 8 MG tablet Take by mouth every 8 (eight) hours as needed for Nausea.    peg 400-propylene glycol 0.4-0.3 % DrpG Apply to eye.    triamcinolone acetonide 0.1% (KENALOG) 0.1 % cream Apply topically 3 (three) times daily as needed (rash/itching).    VASCEPA 1 gram Cap TAKE 2 CAPSULES (2,000 MG TOTAL) BY MOUTH 2 (TWO) TIMES DAILY.    white petrolatum-mineral oil 57.3-42.5% (REFRESH P.M.) 57.3-42.5 % Oint apply a small amount to affected eye  once a day (at bedtime)     Family History       Problem Relation (Age of Onset)    Breast cancer Paternal Aunt    Cancer Mother    Emphysema Father    Heart attack Mother    Hypertension Mother    No Known Problems Sister, Daughter, Maternal Aunt, Maternal Uncle, Paternal Uncle, Maternal Grandmother, Maternal Grandfather, Paternal Grandmother, Paternal Grandfather, Other    Stroke Mother          Tobacco Use    Smoking status: Never    Smokeless tobacco: Never   Substance and Sexual Activity    Alcohol use: Not Currently    Drug use: Not on file    Sexual activity: Never     Review of Systems   Constitutional:  Negative for activity change, appetite change, chills and fever.   HENT:  Negative for ear pain, mouth sores, nosebleeds, sore throat and trouble swallowing.    Eyes:  Negative for visual disturbance.   Respiratory:  Negative for cough, shortness of breath and wheezing.    Cardiovascular:  Negative for chest pain, palpitations and leg swelling.   Gastrointestinal:  Negative for abdominal distention, abdominal pain, blood in stool, constipation, diarrhea, nausea and vomiting.   Endocrine: Negative  for polyphagia.   Genitourinary:  Negative for difficulty urinating, dysuria, flank pain and frequency.   Musculoskeletal:  Positive for arthralgias and gait problem. Negative for back pain and myalgias.   Skin:  Negative for rash.   Allergic/Immunologic: Negative.    Neurological:  Positive for weakness. Negative for dizziness, tremors, seizures, syncope, facial asymmetry, speech difficulty, light-headedness, numbness and headaches.   Hematological:  Negative for adenopathy.   Psychiatric/Behavioral:  Negative for agitation, confusion and hallucinations. The patient is not nervous/anxious.    Objective:     Vital Signs (Most Recent):  Temp: 97.4 °F (36.3 °C) (06/28/23 0732)  Pulse: 92 (06/28/23 0801)  Resp: 19 (06/28/23 0732)  BP: (!) 172/84 (06/28/23 0732)  SpO2: (!) 93 % (06/28/23 0732) Vital Signs (24h Range):  Temp:  [97.4 °F (36.3 °C)-98.2 °F (36.8 °C)] 97.4 °F (36.3 °C)  Pulse:  [67-92] 92  Resp:  [14-20] 19  SpO2:  [91 %-100 %] 93 %  BP: (131-202)/(63-89) 172/84     Weight: 68 kg (150 lb)  Body mass index is 27.44 kg/m².     Physical Exam  Vitals and nursing note reviewed.   Constitutional:       General: She is not in acute distress.     Appearance: Normal appearance. She is not ill-appearing or toxic-appearing.   HENT:      Head: Normocephalic and atraumatic.      Right Ear: External ear normal.      Left Ear: External ear normal.      Nose: Nose normal.      Mouth/Throat:      Mouth: Mucous membranes are moist.   Eyes:      General: No scleral icterus.     Extraocular Movements: Extraocular movements intact.      Conjunctiva/sclera: Conjunctivae normal.      Pupils: Pupils are equal, round, and reactive to light.   Neck:      Vascular: No carotid bruit.   Cardiovascular:      Rate and Rhythm: Normal rate and regular rhythm.      Pulses: Normal pulses.      Heart sounds: Normal heart sounds. No murmur heard.    No friction rub. No gallop.   Pulmonary:      Effort: Pulmonary effort is normal. No respiratory  distress.      Breath sounds: Normal breath sounds. No wheezing, rhonchi or rales.   Abdominal:      General: Bowel sounds are normal. There is no distension.      Palpations: Abdomen is soft.      Tenderness: There is no abdominal tenderness. There is no guarding or rebound.   Musculoskeletal:         General: No swelling. Normal range of motion.      Cervical back: Normal range of motion and neck supple. No rigidity.      Right lower leg: No edema.      Left lower leg: No edema.   Skin:     General: Skin is warm and dry.      Capillary Refill: Capillary refill takes less than 2 seconds.      Coloration: Skin is not jaundiced.      Findings: No rash.   Neurological:      General: No focal deficit present.      Mental Status: She is alert and oriented to person, place, and time. Mental status is at baseline.      GCS: GCS eye subscore is 4. GCS verbal subscore is 5. GCS motor subscore is 6.      Cranial Nerves: Cranial nerves 2-12 are intact. No cranial nerve deficit or facial asymmetry.      Sensory: Sensation is intact.      Motor: Weakness present.      Coordination: Coordination is intact.      Gait: Gait is intact. Gait normal.   Psychiatric:         Mood and Affect: Mood normal.         Behavior: Behavior normal.         Thought Content: Thought content normal.         Judgment: Judgment normal.            CRANIAL NERVES     CN III, IV, VI   Pupils are equal, round, and reactive to light.     Significant Labs: All pertinent labs within the past 24 hours have been reviewed.    Significant Imaging: I have reviewed all pertinent imaging results/findings within the past 24 hours.

## 2023-06-28 NOTE — PLAN OF CARE
06/28/23 1522   HARPER Message   Medicare Outpatient and Observation Notification regarding financial responsibility Given to patient/caregiver;Explained to patient/caregiver;Signed/date by patient/caregiver   Date HARPER was signed 06/28/23   Time HARPER was signed 1527

## 2023-06-28 NOTE — PT/OT/SLP EVAL
Occupational Therapy   Evaluation and Discharge Note    Name: Stephanie Porras  MRN: [id number]  Admitting Diagnosis: TIA (transient ischemic attack)  Recent Surgery: * No surgery found *      Recommendations:     Discharge Recommendations: home  Discharge Equipment Recommendations: none  Barriers to discharge:  None    Assessment:     Stephanie Porras is a 82 y.o. female with a medical diagnosis of TIA (transient ischemic attack). At this time, patient is functioning at their prior level of function and does not require further acute OT services.     Plan:     During this hospitalization, patient does not require further acute OT services.  Please re-consult if situation changes.    Plan of Care Reviewed with: patient, daughter    Subjective     Chief Complaint: none  Patient/Family Comments/goals: none    Occupational Profile:  Living Environment: Pt lives alone in a SSH without steps to enter / exit.  Previous level of function: Independent  Roles and Routines: ADL's and IADL's  Equipment Used at home: CPAP, grab bar (However, patient has a transport chair, RW, and shower chair.)  Assistance upon Discharge: Pt's daughter can assist if needed.     Pain/Comfort:  Pain Rating 1: 0/10  Pain Rating Post-Intervention 1: 0/10    Patients cultural, spiritual, Scientologist conflicts given the current situation: no    Objective:     Communicated with: nurse prior to session.  Patient found ambulatory in room/carmichael with telemetry upon OT entry to room.    General Precautions: Standard,    Orthopedic Precautions: N/A  Braces: N/A  Respiratory Status: Room air     Occupational Performance:    Bed Mobility:    Patient completed Rolling/Turning to Left with  independence  Patient completed Rolling/Turning to Right with independence  Patient completed Scooting/Bridging with independence  Patient completed Supine to Sit with independence  Patient completed Sit to Supine with independence    Functional Mobility/Transfers:  Patient  completed Sit <> Stand Transfer with independence  with  no assistive device   Patient completed Bed <> Chair Transfer using Step Transfer technique with independence with no assistive device  Patient completed Toilet Transfer Step Transfer technique with modified independence with  grab bars  Functional Mobility: Pt ambulated greater than 200' independently without use of AD.    Activities of Daily Living:  Feeding:  independence    Grooming: independence    Bathing: modified independence    Upper Body Dressing: independence    Lower Body Dressing: independence    Toileting: modified independence      Cognitive/Visual Perceptual:  Cognitive/Psychosocial Skills:  -       Oriented to: Person, Place, Time, and Situation   -       Follows Commands/attention:Follows multistep  commands  -       Communication: clear/fluent  -       Memory: No Deficits noted  -       Safety awareness/insight to disability: intact   -       Mood/Affect/Coping skills/emotional control: Cooperative and Pleasant    Physical Exam:  Postural examination/scapula alignment: -       No postural abnormalities identified  Sensation: -       Intact  light/touch   and proprioception bilateral UE's  Dominant hand: -       Right  Upper Extremity Range of Motion:  -       Right Upper Extremity: WFL  -       Left Upper Extremity: WFL  Upper Extremity Strength: -       Right Upper Extremity: WFL  -       Left Upper Extremity: WFL  Fine Motor Coordination: -       Intact  Left hand, manipulation of objects and Right hand, manipulation of objects  Gross motor coordination: WFL    AMPAC 6 Click ADL:  AMPAC Total Score: 24    Treatment & Education:  Pt was provided education / instruction regarding role of OT.    Patient left HOB elevated with all lines intact, call button in reach, and nurse notified    GOALS:   OT evaluation only      History:     Past Medical History:   Diagnosis Date    Breast fibrocystic disorder     Diabetes mellitus     Dislocation,  shoulder, right, sequela     GERD (gastroesophageal reflux disease)     Headache     HTN (hypertension)     Hypothyroidism     Iron deficiency anemia, unspecified     Lumbar pain     Meniere disease     Mixed hyperlipidemia     CISCO (obstructive sleep apnea)     Pruritus     Right shoulder pain     Sciatica     Thyroid nodule          Past Surgical History:   Procedure Laterality Date    APPENDECTOMY      BLADDER SUSPENSION  07/08/2005    Dr Perdomo    BLEPHAROPLASTY, UPPER EYELID  2019    CATARACT EXTRACTION Bilateral 04/2005    COLONOSCOPY  03/11/2008    diverticulosis    CYST RESECTION  06/2010    removed from nose    EYE SURGERY Left 2021    removal of scar tissue    HYSTERECTOMY      OOPHORECTOMY Right     RECTOCELE  07/08/2005    Dr Perdomo    THYROIDECTOMY      TONSILLECTOMY         Time Tracking:     OT Date of Treatment: 06/28/23  OT Start Time: 1153  OT Stop Time: 1225  OT Total Time (min): 32 min    Billable Minutes:Evaluation 32    6/28/2023

## 2023-06-28 NOTE — PLAN OF CARE
[facility]  Initial Discharge Assessment       Primary Care Provider: Yael Santana MD    Admission Diagnosis: TIA (transient ischemic attack) [G45.9]  Elevated blood pressure reading [R03.0]    Admission Date: 6/27/2023  Expected Discharge Date:     Transition of Care Barriers: None    Payor: MEDICARE / Plan: MEDICARE PART A & B / Product Type: Government /     Extended Emergency Contact Information  Primary Emergency Contact: Dick Monroy  Address: [address]           [place], [place] [address] [place] [address]  Mobile Phone: [phone]  Relation: Daughter  Preferred language: English   needed? No    Discharge Plan A: Home  Discharge Plan B: Home, Home Health      [id number] - [place], [place] - [address]  [address]  [facility] [address]  Phone: [phone] Fax: [phone]      Initial Assessment (most recent)       Adult Discharge Assessment - 06/28/23 0730          Discharge Assessment    Assessment Type Discharge Planning Assessment     Confirmed/corrected address, phone number and insurance Yes     Confirmed Demographics Correct on Facesheet     Source of Information patient     When was your last doctors appointment? 06/12/23     People in Home alone     Do you expect to return to your current living situation? Yes     Do you have help at home or someone to help you manage your care at home? No     Prior to hospitilization cognitive status: Alert/Oriented     Current cognitive status: Alert/Oriented     Walking or Climbing Stairs --   Independent    Dressing/Bathing bathing difficulty, requires equipment   Independent    Dressing/Bathing Management grab bars     Do you have any problems with: --   The patient expressed that her daughter might assist he sometimes, but she stated that she still goes to the store by herself.    Home Accessibility stairs to enter home     Number of Stairs, Main Entrance none     Home Layout Able to live on 1st floor     Equipment  "Currently Used at Home CPAP;grab bar     Readmission within 30 days? No     Patient currently being followed by outpatient case management? No     Do you currently have service(s) that help you manage your care at home? No     Do you take prescription medications? Yes     Do you have prescription coverage? Yes     Coverage [employer]     Do you have any problems affording any of your prescribed medications? No     Is the patient taking medications as prescribed? yes     How do you get to doctors appointments? car, drives self   The patient still drives.    Are you on dialysis? No     Do you take coumadin? No     Discharge Plan A Home     Discharge Plan B Home;Home Health     DME Needed Upon Discharge  other (see comments)   TBD    Discharge Plan discussed with: Patient     Transition of Care Barriers None        Physical Activity    On average, how many days per week do you engage in moderate to strenuous exercise (like a brisk walk)? 0 days   The patient expressed that she use to walk, but as of lately, she has not been exercising. "It's too hot."    On average, how many minutes do you engage in exercise at this level? 0 min        Financial Resource Strain    How hard is it for you to pay for the very basics like food, housing, medical care, and heating? Not hard at all        Housing Stability    In the last 12 months, was there a time when you were not able to pay the mortgage or rent on time? Yes     In the last 12 months, how many places have you lived? 1     In the last 12 months, was there a time when you did not have a steady place to sleep or slept in a shelter (including now)? No        Transportation Needs    In the past 12 months, has lack of transportation kept you from medical appointments or from getting medications? No     In the past 12 months, has lack of transportation kept you from meetings, work, or from getting things needed for daily living? No        Food Insecurity    Within the past 12 " months, you worried that your food would run out before you got the money to buy more. Never true     Within the past 12 months, the food you bought just didn't last and you didn't have money to get more. Never true        Stress    Do you feel stress - tense, restless, nervous, or anxious, or unable to sleep at night because your mind is troubled all the time - these days? Not at all        Social Connections    In a typical week, how many times do you talk on the phone with family, friends, or neighbors? More than three times a week     How often do you get together with friends or relatives? Once a week     How often do you attend Druze or Faith services? More than 4 times per year     Do you belong to any clubs or organizations such as Druze groups, unions, fraternal or athletic groups, or school groups? Yes   AARP    How often do you attend meetings of the clubs or organizations you belong to? More than 4 times per year     Are you , , , , never , or living with a partner?         Alcohol Use    Q1: How often do you have a drink containing alcohol? Never     Q2: How many drinks containing alcohol do you have on a typical day when you are drinking? Patient does not drink     Q3: How often do you have six or more drinks on one occasion? Never                 Initial discharge assessment is completed. Spoke to the patient in her room. She was awake, alert, and oriented to the questions being asked. The patient expressed that she lives alone. Prior to being admitted, she was independent, and she still drives. The patient does not require any assistance or DME to ambulate with. She plans to return home upon discharge. The patient does have a CPAP machine. I was able to provide the patient with an [facility] discharge planning brochure with case management's contact information. CM/SW will remain available.

## 2023-06-28 NOTE — ASSESSMENT & PLAN NOTE
Patient's FSGs are controlled on current medication regimen.  Last A1c reviewed-   Lab Results   Component Value Date    HGBA1C 7.4 (H) 06/27/2023    HGBA1C 7.4 (H) 06/27/2023     Most recent fingerstick glucose reviewed-   Recent Labs   Lab 06/27/23  1602 06/27/23 2038 06/28/23  0629   POCTGLUCOSE 89 175* 113*     Current correctional scale  Low  Maintain anti-hyperglycemic dose as follows-   Antihyperglycemics (From admission, onward)    Start     Stop Route Frequency Ordered    06/27/23 1743  insulin aspart U-100 pen 0-5 Units         -- SubQ Before meals & nightly PRN 06/27/23 1659        Hold Oral hypoglycemics while patient is in the hospital.

## 2023-06-28 NOTE — ASSESSMENT & PLAN NOTE
06/28 CP:  Patient was hypotensive upon admission to the emergency department.  Some hypertension noted this morning.  Restarted home blood pressure medications.  We will continue to monitor.

## 2023-06-28 NOTE — ASSESSMENT & PLAN NOTE
06/28 CP:  Blood counts stable.  We will continue to monitor.    Hemoglobin   Date Value Ref Range Status   06/28/2023 11.4 (L) 12.0 - 16.0 g/dL Final      Hematocrit   Date Value Ref Range Status   06/28/2023 34.3 (L) 37.0 - 48.5 % Final

## 2023-06-28 NOTE — DISCHARGE SUMMARY
"[facility] Medicine  Discharge Summary      Patient Name: Stephanie Porras  MRN: [id number]  JOJO: [id number]  Patient Class: OP- Observation  Admission Date: 6/27/2023  Hospital Length of Stay: 0 days  Discharge Date and Time:  06/28/2023 4:09 PM  Attending Physician: Yael Santana MD   Discharging Provider: SHAWN RAM  Primary Care Provider: Yael Santana MD    Primary Care Team: Networked reference to record PCT     HPI:   ED HPI: 82-year-old female with a history of hypertension, hypothyroidism, hyperlipidemia presents to the emergency department after an episode of confusion, was sending gib[employer]eleran Technologiesish texts to her daughter, has improved tremendously.  Here in the emergency room she is alert oriented x3, GCS is 15.  Neighbor thinks her speech is "heavy" sounding.  No focal deficits, no facial droop, steady gait.  No history of this in the past.  Episode occurred about 1 hour ago per neighbor.  The last known well was roughly 3:00 p.m.    IM HPI:  Agree with the above HPI.  Patient has a history of diabetes, hypertension, hypothyroidism, anemia, Meniere disease, hyperlipidemia, GERD.  Patient states that she had an episode of confusion and aphasia then was brought to the emergency department.  Patient reports that her blood glucose yesterday was about 120.  Patient is awake alert and oriented this morning.  Neurological exam is within normal limits, GCS is 15.  Patient's TIA symptoms have completely resolved.  PT and OT to assess for any residual weakness.  Patient will have an MRI brain today.  CT of head yesterday resulted negative for stroke.  Approximately 50% stenosis noted to distal left ICA and distal right vertebral artery.  We will increase statin dosage and added Plavix to regimen.  Patient will stay overnight with possible discharge tomorrow.      * No surgery found *      Hospital Course:   Discharge Summary:  MRI of brain done today resulted negative for acute " ischemia.  Patient officially diagnosed with TIA.  Continue home medications.  Atorvastatin increased dose and start Plavix daily.  Follow up with PCP in one-week.  Follow up with Dr. 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 Patient is stable for discharge.       Goals of Care Treatment Preferences:  Code Status: Full Code      Consults:   Consults (From admission, onward)        Status Ordering Provider     Consult to Telemedicine - Acute Stroke  Once        Provider:  Chaparro Britt MD    Acknowledged LAM THOMPSON          Neuro  * TIA (transient ischemic attack)  Antithrombotics for secondary stroke prevention: Antiplatelets: Aspirin: 81 mg daily  Clopidogrel: 75 mg daily    Statins for secondary stroke prevention and hyperlipidemia, if present:   Statins: Atorvastatin- 40 mg daily    Aggressive risk factor modification: HTN, DM, HLD, Obesity     Rehab efforts: The patient has been evaluated by a stroke team provider and the therapy needs have been fully considered based off the presenting complaints and exam findings. The following therapy evaluations are needed: PT evaluate and treat, OT evaluate and treat    Diagnostics ordered/pending: CT scan of head without contrast to asses brain parenchyma, CTA Head to assess vasculature , CTA Neck/Arch to assess vasculature, HgbA1C to assess blood glucose levels, MRI head without contrast to assess brain parenchyma    VTE prophylaxis: Mechanical prophylaxis: Place SCDs    06/28 CP: CT of head performed yesterday resulted no acute intracranial findings.  CTA of brain and neck resulted 50% stenosis and distal left ICA and distal right vertebral artery.  MRI of brain with and without contrast to be performed today.  Plavix added to regimen, continue daily aspirin.  Atorvastatin dose increased.  TIA symptoms of aphasia and confusion have resolved since admission.  PT and OT ordered.    MRI brain today negative for acute findings.  We will  discharge with increase atorvastatin dose and daily Plavix.  Close follow up with Cardiology for stenosis of left ICA and TIA diagnosis.    Cardiac/Vascular  Hyperlipidemia, mixed  Statin dose increased secondary to TIA and stenosis of left ICA and right vertebral artery.      Benign essential hypertension  06/28 CP:  Patient was hypotensive upon admission to the emergency department.  Some hypertension noted this morning.  Restarted home blood pressure medications.  We will continue to monitor.      Oncology  Anemia, iron deficiency  06/28 CP:  Blood counts stable.  We will continue to monitor.    Hemoglobin   Date Value Ref Range Status   06/28/2023 11.4 (L) 12.0 - 16.0 g/dL Final      Hematocrit   Date Value Ref Range Status   06/28/2023 34.3 (L) 37.0 - 48.5 % Final        Endocrine  Type 2 diabetes mellitus with circulatory disorder, without long-term current use of insulin  Patient's FSGs are controlled on current medication regimen.  Last A1c reviewed-   Lab Results   Component Value Date    HGBA1C 7.4 (H) 06/27/2023    HGBA1C 7.4 (H) 06/27/2023     Most recent fingerstick glucose reviewed-   Recent Labs   Lab 06/27/23  1602 06/27/23  2038 06/28/23  0629   POCTGLUCOSE 89 175* 113*     Current correctional scale  Low  Maintain anti-hyperglycemic dose as follows-   Antihyperglycemics (From admission, onward)    Start     Stop Route Frequency Ordered    06/27/23 1743  insulin aspart U-100 pen 0-5 Units         -- SubQ Before meals & nightly PRN 06/27/23 1659        Hold Oral hypoglycemics while patient is in the hospital.    Hypothyroidism  Levothyroxine ordered.      GI  Gastroesophageal reflux  Pepcid ordered.        Final Active Diagnoses:    Diagnosis Date Noted POA    PRINCIPAL PROBLEM:  TIA (transient ischemic attack) [G45.9] 06/28/2023 Yes    On deep vein thrombosis (DVT) prophylaxis [Z79.899] 06/28/2023 Not Applicable    Type 2 diabetes mellitus with circulatory disorder, without long-term current use  of insulin [E11.59] 06/12/2023 Yes    Anemia, iron deficiency [D50.9] 07/20/2022 Yes    Gastroesophageal reflux [K21.9] 07/20/2022 Yes    Benign essential hypertension [I10] 04/29/2022 Yes    Hyperlipidemia, mixed [E78.2] 04/29/2022 Yes    Hypothyroidism [E03.9] 04/29/2022 Yes      Problems Resolved During this Admission:       Discharged Condition: good    Disposition: Home or Self Care    Follow Up:   Follow-up Information     Yael Santana MD Follow up in 1 week(s).    Specialty: Internal Medicine  Contact information:  [address] [facility] [address]  [phone]             Pierce Mak MD Follow up in 2 week(s).    Specialty: Cardiology  Contact information:  [address]  [facility] [address]  [phone]                       Patient Instructions:      Diet diabetic     Diet Cardiac     Activity as tolerated       Significant Diagnostic Studies: Labs:   CMP   Recent Labs   Lab 06/27/23  1610 06/28/23  0456   * 139   K 4.0 3.8    105   CO2 26 26   GLU 94 104   BUN 32* 29*   CREATININE 1.2 0.9   CALCIUM 9.8 9.3   PROT 7.5 6.4   ALBUMIN 4.4 3.7   BILITOT 0.3 0.3   ALKPHOS 57 47*   AST 20 15   ALT 35 28   ANIONGAP 9 8   , CBC   Recent Labs   Lab 06/27/23  1610 06/28/23  0456   WBC 6.81 4.83   HGB 12.9 11.4*   HCT 38.4 34.3*    181   , Troponin No results for input(s): TROPONINI in the last 168 hours. and All labs within the past 24 hours have been reviewed  Radiology: X-Ray: CXR: X-Ray Chest 1 View (CXR): No results found for this visit on 06/27/23. and X-Ray Chest PA and Lateral (CXR): No results found for this visit on 06/27/23.  CT scan: CT ABDOMEN PELVIS WITH CONTRAST: No results found for this visit on 06/27/23. and CT ABDOMEN PELVIS WITHOUT CONTRAST: No results found for this visit on 06/27/23.  Cardiac Graphics: Echocardiogram: 2D echo with color flow doppler: No results found for this or any previous visit. and Transthoracic echo (TTE)  complete (Cupid Only): No results found for this or any previous visit.    Pending Diagnostic Studies:     None         Medications:  Reconciled Home Medications:      Medication List      START taking these medications    acetaminophen 325 MG tablet  Commonly known as: TYLENOL  Take 2 tablets (650 mg total) by mouth every 8 (eight) hours as needed for Temperature greater than (or equal to 101 degree F).     clopidogreL 75 mg tablet  Commonly known as: PLAVIX  Take 1 tablet (75 mg total) by mouth once daily.  Start taking on: June 29, 2023        CHANGE how you take these medications    atorvastatin 40 MG tablet  Commonly known as: LIPITOR  Take 1 tablet (40 mg total) by mouth once daily.  Start taking on: June 29, 2023  What changed:   · medication strength  · See the new instructions.        CONTINUE taking these medications    amlodipine-benazepril 2.5-10 mg 2.5-10 mg per capsule  Commonly known as: LOTREL  TAKE 1 CAPSULE BY MOUTH EVERY DAY     ascorbic acid (vitamin C) 500 MG tablet  Commonly known as: VITAMIN C  Vitamin C 500 mg tablet, [RxNorm: 332076]     aspirin 81 MG EC tablet  Commonly known as: ECOTRIN  Take 81 mg by mouth once daily.     blood sugar diagnostic Strp  One Touch Ultra2 test strips-Use to check blood sugar daily Dx: E11.9     blood-glucose meter Misc  Commonly known as: ONETOUCH ULTRA2 METER  CHECK BLOOD SUGAR ONCE DAILY     cholecalciferol (vitamin D3) 25 mcg (1,000 unit) Chew  1 tablet.     diazePAM 5 MG tablet  Commonly known as: VALIUM  1/2 pill po qday for tremors and then 1 pill po tid prn dizziness     lancets 32 gauge Misc  One Touch Ultra lancets- Use to check blood sugar daily     levothyroxine 75 MCG tablet  Commonly known as: SYNTHROID  TAKE 1 TABLET BY MOUTH DAILY     meclizine 50 MG tablet  Commonly known as: ANTIVERT  Take 25 mg by mouth 3 (three) times daily as needed.     metFORMIN 500 MG tablet  Commonly known as: GLUCOPHAGE  Take 1 tablet (500 mg total) by mouth 2 (two)  times daily with meals.     metoprolol succinate 100 MG 24 hr tablet  Commonly known as: TOPROL-XL  TAKE 1 TABLET BY MOUTH EVERY DAY     omeprazole 20 MG capsule  Commonly known as: PRILOSEC  TAKE 1 CAPSULE BY MOUTH EVERY DAY     ondansetron 8 MG tablet  Commonly known as: ZOFRAN  Take by mouth every 8 (eight) hours as needed for Nausea.     peg 400-propylene glycol 0.4-0.3 % Drpg  Apply to eye.     triamcinolone acetonide 0.1% 0.1 % cream  Commonly known as: KENALOG  Apply topically 3 (three) times daily as needed (rash/itching).     VASCEPA 1 gram Cap  Generic drug: icosapent ethyL  TAKE 2 CAPSULES (2,000 MG TOTAL) BY MOUTH 2 (TWO) TIMES DAILY.     white petrolatum-mineral oil 57.3-42.5% 57.3-42.5 % Oint  Commonly known as: REFRESH P.M.  apply a small amount to affected eye  once a day (at bedtime)            Indwelling Lines/Drains at time of discharge:   Lines/Drains/Airways     None                 Time spent on the discharge of patient: 30 minutes         SHAWN RAM  Department of Hospital Medicine  [facility]

## 2023-06-28 NOTE — ASSESSMENT & PLAN NOTE
Antithrombotics for secondary stroke prevention: Antiplatelets: Aspirin: 81 mg daily  Clopidogrel: 75 mg daily    Statins for secondary stroke prevention and hyperlipidemia, if present:   Statins: Atorvastatin- 40 mg daily    Aggressive risk factor modification: HTN, DM, HLD, Obesity     Rehab efforts: The patient has been evaluated by a stroke team provider and the therapy needs have been fully considered based off the presenting complaints and exam findings. The following therapy evaluations are needed: PT evaluate and treat, OT evaluate and treat    Diagnostics ordered/pending: CT scan of head without contrast to asses brain parenchyma, CTA Head to assess vasculature , CTA Neck/Arch to assess vasculature, HgbA1C to assess blood glucose levels, MRI head without contrast to assess brain parenchyma    VTE prophylaxis: Mechanical prophylaxis: Place SCDs    06/28 CP: CT of head performed yesterday resulted no acute intracranial findings.  CTA of brain and neck resulted 50% stenosis and distal left ICA and distal right vertebral artery.  MRI of brain with and without contrast to be performed today.  Plavix added to regimen, continue daily aspirin.  Atorvastatin dose increased.  TIA symptoms of aphasia and confusion have resolved since admission.  PT and OT ordered.

## 2023-06-28 NOTE — PT/OT/SLP EVAL
Physical Therapy Evaluation    Patient Name:  Stephanie Porras   MRN:  [id number]    Recommendations:     Discharge Recommendations: home   Discharge Equipment Recommendations: none   Barriers to discharge: None    Assessment:     Stephanie Porras is a 82 y.o. female admitted with a medical diagnosis of TIA (transient ischemic attack).  She presents with the following impairments/functional limitations:   Patient does not demonstrate limitations that require physical therapy intervention at this time.   Rehab Prognosis:  patient does not require physical therapy intervention ;   Recent Surgery: * No surgery found *      Plan:     During this hospitalization, patient to be seen  (Patient does not require physical therapy)     Subjective     Chief Complaint: None at this time  Patient/Family Comments/goals: Patient would like to go home  Pain/Comfort:  Pain Rating 1: 0/10    Patients cultural, spiritual, Mandaeism conflicts given the current situation: no    Living Environment:  Patient lives alone in [facility]  Prior to admission, patients level of function was Independent.  Equipment used at home: CPAP, walker, rolling, grab bar, shower chair.  DME owned (not currently used): none.  Upon discharge, patient will have assistance from family.    Objective:     Communicated with patient and nurse prior to session.  Patient found HOB elevated with telemetry  upon PT entry to room.    General Precautions: Standard,    Orthopedic Precautions:N/A   Braces: N/A  Respiratory Status: Room air    Exams:  Cognitive Exam:  Patient is oriented to Person, Place, Time, and Situation  RLE ROM: WFL  RLE Strength: WFL  LLE ROM: WFL  LLE Strength: WFL    Functional Mobility:  Bed Mobility:     Rolling Left:  independence  Scooting: independence  Supine to Sit: independence  Transfers:     Sit to Stand:  independence with no AD  Gait: 200 feet with no AD and independence  Balance: No LOB noted with sitting or standing      AM-PAC 6 CLICK  MOBILITY  Total Score:24       Treatment & Education:  Patient educated on role of physical therapy.     Patient left up in chair with all lines intact, call button in reach, and nurse notified.    GOALS:   Multidisciplinary Problems       Physical Therapy Goals       Not on file                    History:     Past Medical History:   Diagnosis Date    Breast fibrocystic disorder     Diabetes mellitus     Dislocation, shoulder, right, sequela     GERD (gastroesophageal reflux disease)     Headache     HTN (hypertension)     Hypothyroidism     Iron deficiency anemia, unspecified     Lumbar pain     Meniere disease     Mixed hyperlipidemia     CISCO (obstructive sleep apnea)     Pruritus     Right shoulder pain     Sciatica     Thyroid nodule        Past Surgical History:   Procedure Laterality Date    APPENDECTOMY      BLADDER SUSPENSION  07/08/2005    Dr Perdomo    BLEPHAROPLASTY, UPPER EYELID  2019    CATARACT EXTRACTION Bilateral 04/2005    COLONOSCOPY  03/11/2008    diverticulosis    CYST RESECTION  06/2010    removed from nose    EYE SURGERY Left 2021    removal of scar tissue    HYSTERECTOMY      OOPHORECTOMY Right     RECTOCELE  07/08/2005    Dr Perdomo    THYROIDECTOMY      TONSILLECTOMY         Time Tracking:     PT Received On: 06/28/23  PT Start Time: 1500     PT Stop Time: 1515  PT Total Time (min): 15 min     Billable Minutes: Evaluation 15 minutes      06/28/2023

## 2023-06-28 NOTE — HOSPITAL COURSE
Discharge Summary:  MRI of brain done today resulted negative for acute ischemia.  Patient officially diagnosed with TIA.  Continue home medications.  Atorvastatin increased dose and start Plavix daily.  Follow up with PCP in one-week.  Follow up with Dr. 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 Patient is stable for discharge.

## 2023-06-28 NOTE — ASSESSMENT & PLAN NOTE
Antithrombotics for secondary stroke prevention: Antiplatelets: Aspirin: 81 mg daily  Clopidogrel: 75 mg daily    Statins for secondary stroke prevention and hyperlipidemia, if present:   Statins: Atorvastatin- 40 mg daily    Aggressive risk factor modification: HTN, DM, HLD, Obesity     Rehab efforts: The patient has been evaluated by a stroke team provider and the therapy needs have been fully considered based off the presenting complaints and exam findings. The following therapy evaluations are needed: PT evaluate and treat, OT evaluate and treat    Diagnostics ordered/pending: CT scan of head without contrast to asses brain parenchyma, CTA Head to assess vasculature , CTA Neck/Arch to assess vasculature, HgbA1C to assess blood glucose levels, MRI head without contrast to assess brain parenchyma    VTE prophylaxis: Mechanical prophylaxis: Place SCDs    06/28 CP: CT of head performed yesterday resulted no acute intracranial findings.  CTA of brain and neck resulted 50% stenosis and distal left ICA and distal right vertebral artery.  MRI of brain with and without contrast to be performed today.  Plavix added to regimen, continue daily aspirin.  Atorvastatin dose increased.  TIA symptoms of aphasia and confusion have resolved since admission.  PT and OT ordered.    MRI brain today negative for acute findings.  We will discharge with increase atorvastatin dose and daily Plavix.  Close follow up with Cardiology for stenosis of left ICA and TIA diagnosis.

## 2023-06-28 NOTE — PLAN OF CARE
No acute changes. Pt remained oriented x 4 throughout shift. No confusion, facial drooping, or weakness noted. Pt ambulated to restroom well. Vital signs stable. Pt rested quietly with CPAP on, no complaints.     Problem: Adult Inpatient Plan of Care  Goal: Plan of Care Review  Outcome: Ongoing, Progressing  Flowsheets (Taken 6/28/2023 0442)  Plan of Care Reviewed With: patient  Goal: Patient-Specific Goal (Individualized)  Outcome: Ongoing, Progressing  Goal: Absence of Hospital-Acquired Illness or Injury  Outcome: Ongoing, Progressing  Goal: Optimal Comfort and Wellbeing  Outcome: Ongoing, Progressing  Goal: Readiness for Transition of Care  Outcome: Ongoing, Progressing     Problem: Diabetes Comorbidity  Goal: Blood Glucose Level Within Targeted Range  Outcome: Ongoing, Progressing

## 2023-06-28 NOTE — HPI
"ED HPI: 82-year-old female with a history of hypertension, hypothyroidism, hyperlipidemia presents to the emergency department after an episode of confusion, was sending gibberish texts to her daughter, has improved tremendously.  Here in the emergency room she is alert oriented x3, GCS is 15.  Neighbor thinks her speech is "heavy" sounding.  No focal deficits, no facial droop, steady gait.  No history of this in the past.  Episode occurred about 1 hour ago per neighbor.  The last known well was roughly 3:00 p.m.    IM HPI:  Agree with the above HPI.  Patient has a history of diabetes, hypertension, hypothyroidism, anemia, Meniere disease, hyperlipidemia, GERD.  Patient states that she had an episode of confusion and aphasia then was brought to the emergency department.  Patient reports that her blood glucose yesterday was about 120.  Patient is awake alert and oriented this morning.  Neurological exam is within normal limits, GCS is 15.  Patient's TIA symptoms have completely resolved.  PT and OT to assess for any residual weakness.  Patient will have an MRI brain today.  CT of head yesterday resulted negative for stroke.  Approximately 50% stenosis noted to distal left ICA and distal right vertebral artery.  We will increase statin dosage and added Plavix to regimen.  Patient will stay overnight with possible discharge tomorrow.  "

## 2023-06-29 RX ADMIN — GADOBUTROL 7 ML: 604.72 INJECTION INTRAVENOUS at 02:06

## 2023-07-06 ENCOUNTER — HOSPITAL ENCOUNTER (OUTPATIENT)
Dept: RADIOLOGY | Facility: HOSPITAL | Age: 83
Discharge: HOME | End: 2023-07-06
Payer: MEDICARE

## 2023-07-06 DIAGNOSIS — M79.672 ACUTE PAIN OF LEFT FOOT: ICD-10-CM

## 2023-07-06 PROBLEM — I35.1 AORTIC VALVE REGURGITATION: Status: ACTIVE | Noted: 2017-05-16

## 2023-07-06 PROCEDURE — 73630 X-RAY EXAM OF FOOT: CPT | Mod: TC,LT

## 2023-09-18 PROBLEM — Z00.00 WELLNESS EXAMINATION: Status: RESOLVED | Noted: 2022-05-01 | Resolved: 2023-09-18

## 2023-10-02 PROBLEM — G45.9 TIA (TRANSIENT ISCHEMIC ATTACK): Status: RESOLVED | Noted: 2023-06-28 | Resolved: 2023-10-02

## 2023-10-04 ENCOUNTER — PATIENT MESSAGE (OUTPATIENT)
Dept: ADMINISTRATIVE | Facility: OTHER | Age: 83
End: 2023-10-04
Payer: MEDICARE

## 2024-03-20 ENCOUNTER — HOSPITAL ENCOUNTER (OUTPATIENT)
Dept: RADIOLOGY | Facility: HOSPITAL | Age: 84
Discharge: HOME | End: 2024-03-20
Attending: INTERNAL MEDICINE
Payer: MEDICARE

## 2024-03-20 VITALS — BODY MASS INDEX: 24.66 KG/M2 | HEIGHT: 62 IN | WEIGHT: 134 LBS

## 2024-03-20 DIAGNOSIS — Z78.0 POSTMENOPAUSAL: ICD-10-CM

## 2024-03-20 DIAGNOSIS — Z12.31 SCREENING MAMMOGRAM FOR BREAST CANCER: ICD-10-CM

## 2024-03-20 PROCEDURE — 77067 SCR MAMMO BI INCL CAD: CPT | Mod: TC

## 2024-03-20 PROCEDURE — 77080 DXA BONE DENSITY AXIAL: CPT | Mod: TC

## 2024-06-26 ENCOUNTER — LAB VISIT (OUTPATIENT)
Dept: LAB | Facility: HOSPITAL | Age: 84
End: 2024-06-26
Attending: INTERNAL MEDICINE
Payer: MEDICARE

## 2024-06-26 DIAGNOSIS — I10 BENIGN ESSENTIAL HYPERTENSION: ICD-10-CM

## 2024-06-26 DIAGNOSIS — E55.9 VITAMIN D DEFICIENCY: ICD-10-CM

## 2024-06-26 DIAGNOSIS — E03.9 HYPOTHYROIDISM, UNSPECIFIED TYPE: ICD-10-CM

## 2024-06-26 DIAGNOSIS — Z00.00 WELLNESS EXAMINATION: ICD-10-CM

## 2024-06-26 DIAGNOSIS — E11.59 TYPE 2 DIABETES MELLITUS WITH OTHER CIRCULATORY COMPLICATION, WITHOUT LONG-TERM CURRENT USE OF INSULIN: ICD-10-CM

## 2024-06-26 LAB
25(OH)D3+25(OH)D2 SERPL-MCNC: 84 NG/ML (ref 30–96)
ALBUMIN SERPL BCP-MCNC: 4 G/DL (ref 3.5–5.2)
ALBUMIN/CREAT UR: 36.1 UG/MG (ref 0–30)
ALP SERPL-CCNC: 64 U/L (ref 55–135)
ALT SERPL W/O P-5'-P-CCNC: 33 U/L (ref 10–44)
ANION GAP SERPL CALC-SCNC: 10 MMOL/L (ref 3–11)
AST SERPL-CCNC: 23 U/L (ref 10–40)
BASOPHILS # BLD AUTO: 0.04 K/UL (ref 0–0.2)
BASOPHILS NFR BLD: 0.6 % (ref 0–1.9)
BILIRUB SERPL-MCNC: 0.5 MG/DL (ref 0.1–1)
BUN SERPL-MCNC: 23 MG/DL (ref 8–23)
CALCIUM SERPL-MCNC: 9.6 MG/DL (ref 8.7–10.5)
CHLORIDE SERPL-SCNC: 102 MMOL/L (ref 95–110)
CO2 SERPL-SCNC: 26 MMOL/L (ref 23–29)
CREAT SERPL-MCNC: 0.9 MG/DL (ref 0.5–1.4)
CREAT UR-MCNC: 23 MG/DL (ref 15–325)
DIFFERENTIAL METHOD BLD: ABNORMAL
EOSINOPHIL # BLD AUTO: 0.1 K/UL (ref 0–0.5)
EOSINOPHIL NFR BLD: 1.7 % (ref 0–8)
ERYTHROCYTE [DISTWIDTH] IN BLOOD BY AUTOMATED COUNT: 14.9 % (ref 11.5–14.5)
EST. GFR  (NO RACE VARIABLE): >60 ML/MIN/1.73 M^2
GLUCOSE SERPL-MCNC: 111 MG/DL (ref 70–110)
HCT VFR BLD AUTO: 37.6 % (ref 37–48.5)
HGB BLD-MCNC: 12.4 G/DL (ref 12–16)
IMM GRANULOCYTES # BLD AUTO: 0.02 K/UL (ref 0–0.04)
IMM GRANULOCYTES NFR BLD AUTO: 0.3 % (ref 0–0.5)
LYMPHOCYTES # BLD AUTO: 1.5 K/UL (ref 1–4.8)
LYMPHOCYTES NFR BLD: 21.9 % (ref 18–48)
MCH RBC QN AUTO: 28.9 PG (ref 27–31)
MCHC RBC AUTO-ENTMCNC: 33 G/DL (ref 32–36)
MCV RBC AUTO: 88 FL (ref 82–98)
MICROALBUMIN UR DL<=1MG/L-MCNC: 8.3 MG/L
MONOCYTES # BLD AUTO: 0.6 K/UL (ref 0.3–1)
MONOCYTES NFR BLD: 8.7 % (ref 4–15)
NEUTROPHILS # BLD AUTO: 4.6 K/UL (ref 1.8–7.7)
NEUTROPHILS NFR BLD: 66.8 % (ref 38–73)
NRBC BLD-RTO: 0 /100 WBC
PLATELET # BLD AUTO: 224 K/UL (ref 150–450)
PMV BLD AUTO: 11.3 FL (ref 9.2–12.9)
POTASSIUM SERPL-SCNC: 4.2 MMOL/L (ref 3.5–5.1)
PROT SERPL-MCNC: 7.5 G/DL (ref 6–8.4)
RBC # BLD AUTO: 4.29 M/UL (ref 4–5.4)
SODIUM SERPL-SCNC: 138 MMOL/L (ref 136–145)
TSH SERPL DL<=0.005 MIU/L-ACNC: 0.7 UIU/ML (ref 0.4–4)
WBC # BLD AUTO: 6.91 K/UL (ref 3.9–12.7)

## 2024-06-26 PROCEDURE — 36415 COLL VENOUS BLD VENIPUNCTURE: CPT | Performed by: INTERNAL MEDICINE

## 2024-06-26 PROCEDURE — 85025 COMPLETE CBC W/AUTO DIFF WBC: CPT | Performed by: INTERNAL MEDICINE

## 2024-06-26 PROCEDURE — 82043 UR ALBUMIN QUANTITATIVE: CPT | Performed by: INTERNAL MEDICINE

## 2024-06-26 PROCEDURE — 84443 ASSAY THYROID STIM HORMONE: CPT | Performed by: INTERNAL MEDICINE

## 2024-06-26 PROCEDURE — 82570 ASSAY OF URINE CREATININE: CPT | Performed by: INTERNAL MEDICINE

## 2024-06-26 PROCEDURE — 80053 COMPREHEN METABOLIC PANEL: CPT | Performed by: INTERNAL MEDICINE

## 2024-06-26 PROCEDURE — 82306 VITAMIN D 25 HYDROXY: CPT | Performed by: INTERNAL MEDICINE

## 2024-09-30 PROBLEM — Z00.00 WELLNESS EXAMINATION: Status: RESOLVED | Noted: 2022-05-01 | Resolved: 2024-09-30

## 2024-10-21 ENCOUNTER — PATIENT MESSAGE (OUTPATIENT)
Dept: FAMILY MEDICINE | Facility: CLINIC | Age: 84
End: 2024-10-21
Payer: MEDICARE

## 2025-03-24 ENCOUNTER — HOSPITAL ENCOUNTER (OUTPATIENT)
Dept: RADIOLOGY | Facility: HOSPITAL | Age: 85
Discharge: HOME | End: 2025-03-24
Attending: INTERNAL MEDICINE
Payer: MEDICARE

## 2025-03-24 VITALS — WEIGHT: 139 LBS | HEIGHT: 62 IN | BODY MASS INDEX: 25.58 KG/M2

## 2025-03-24 DIAGNOSIS — Z12.31 SCREENING MAMMOGRAM FOR BREAST CANCER: ICD-10-CM

## 2025-03-24 PROCEDURE — 77063 BREAST TOMOSYNTHESIS BI: CPT | Mod: TC

## 2025-07-23 ENCOUNTER — LAB VISIT (OUTPATIENT)
Dept: LAB | Facility: HOSPITAL | Age: 85
End: 2025-07-23
Attending: INTERNAL MEDICINE
Payer: MEDICARE

## 2025-07-23 DIAGNOSIS — E55.9 VITAMIN D DEFICIENCY: ICD-10-CM

## 2025-07-23 DIAGNOSIS — E11.65 TYPE 2 DIABETES MELLITUS WITH HYPERGLYCEMIA, WITHOUT LONG-TERM CURRENT USE OF INSULIN: ICD-10-CM

## 2025-07-23 DIAGNOSIS — E78.2 HYPERLIPIDEMIA, MIXED: ICD-10-CM

## 2025-07-23 DIAGNOSIS — I10 BENIGN ESSENTIAL HYPERTENSION: ICD-10-CM

## 2025-07-23 DIAGNOSIS — Z00.00 WELLNESS EXAMINATION: ICD-10-CM

## 2025-07-23 LAB
25(OH)D3+25(OH)D2 SERPL-MCNC: 69 NG/ML (ref 30–96)
ABSOLUTE EOSINOPHIL (OHS): 0.08 K/UL
ABSOLUTE MONOCYTE (OHS): 0.5 K/UL (ref 0.3–1)
ABSOLUTE NEUTROPHIL COUNT (OHS): 3.41 K/UL (ref 1.8–7.7)
ALBUMIN SERPL BCP-MCNC: 4.5 G/DL (ref 3.5–5.2)
ALBUMIN/CREAT UR: 14.7 UG/MG
ALP SERPL-CCNC: 50 UNIT/L (ref 40–150)
ALT SERPL W/O P-5'-P-CCNC: 26 UNIT/L (ref 10–44)
ANION GAP (OHS): 14 MMOL/L (ref 8–16)
AST SERPL-CCNC: 29 UNIT/L (ref 11–45)
BASOPHILS # BLD AUTO: 0.03 K/UL
BASOPHILS NFR BLD AUTO: 0.5 %
BILIRUB SERPL-MCNC: 0.4 MG/DL (ref 0.1–1)
BUN SERPL-MCNC: 33 MG/DL (ref 8–23)
CALCIUM SERPL-MCNC: 10.1 MG/DL (ref 8.7–10.5)
CHLORIDE SERPL-SCNC: 99 MMOL/L (ref 95–110)
CO2 SERPL-SCNC: 24 MMOL/L (ref 23–29)
CREAT SERPL-MCNC: 1 MG/DL (ref 0.5–1.4)
CREAT UR-MCNC: 67.8 MG/DL (ref 15–325)
ERYTHROCYTE [DISTWIDTH] IN BLOOD BY AUTOMATED COUNT: 13.6 % (ref 11.5–14.5)
GFR SERPLBLD CREATININE-BSD FMLA CKD-EPI: 55 ML/MIN/1.73/M2
GLUCOSE SERPL-MCNC: 104 MG/DL (ref 70–110)
HCT VFR BLD AUTO: 33.8 % (ref 37–48.5)
HGB BLD-MCNC: 11.3 GM/DL (ref 12–16)
IMM GRANULOCYTES # BLD AUTO: 0.02 K/UL (ref 0–0.04)
IMM GRANULOCYTES NFR BLD AUTO: 0.3 % (ref 0–0.5)
LYMPHOCYTES # BLD AUTO: 1.73 K/UL (ref 1–4.8)
MCH RBC QN AUTO: 29.7 PG (ref 27–31)
MCHC RBC AUTO-ENTMCNC: 33.4 G/DL (ref 32–36)
MCV RBC AUTO: 89 FL (ref 82–98)
MICROALBUMIN UR-MCNC: 10 UG/ML (ref ?–5000)
NUCLEATED RBC (/100WBC) (OHS): 0 /100 WBC
PLATELET # BLD AUTO: 226 K/UL (ref 150–450)
PMV BLD AUTO: 11.7 FL (ref 9.2–12.9)
POTASSIUM SERPL-SCNC: 4.5 MMOL/L (ref 3.5–5.1)
PROT SERPL-MCNC: 7.1 GM/DL (ref 6–8.4)
RBC # BLD AUTO: 3.8 M/UL (ref 4–5.4)
RELATIVE EOSINOPHIL (OHS): 1.4 %
RELATIVE LYMPHOCYTE (OHS): 30 % (ref 18–48)
RELATIVE MONOCYTE (OHS): 8.7 % (ref 4–15)
RELATIVE NEUTROPHIL (OHS): 59.1 % (ref 38–73)
SODIUM SERPL-SCNC: 137 MMOL/L (ref 136–145)
TSH SERPL-ACNC: 1.02 UIU/ML (ref 0.4–4)
WBC # BLD AUTO: 5.77 K/UL (ref 3.9–12.7)

## 2025-07-23 PROCEDURE — 84443 ASSAY THYROID STIM HORMONE: CPT

## 2025-07-23 PROCEDURE — 85025 COMPLETE CBC W/AUTO DIFF WBC: CPT

## 2025-07-23 PROCEDURE — 82043 UR ALBUMIN QUANTITATIVE: CPT

## 2025-07-23 PROCEDURE — 36415 COLL VENOUS BLD VENIPUNCTURE: CPT

## 2025-07-23 PROCEDURE — 80053 COMPREHEN METABOLIC PANEL: CPT

## 2025-07-23 PROCEDURE — 82306 VITAMIN D 25 HYDROXY: CPT
